# Patient Record
Sex: FEMALE | Race: WHITE | NOT HISPANIC OR LATINO | Employment: OTHER | URBAN - METROPOLITAN AREA
[De-identification: names, ages, dates, MRNs, and addresses within clinical notes are randomized per-mention and may not be internally consistent; named-entity substitution may affect disease eponyms.]

---

## 2017-01-12 ENCOUNTER — OFFICE VISIT (OUTPATIENT)
Dept: MEDICAL GROUP | Facility: CLINIC | Age: 62
End: 2017-01-12
Payer: COMMERCIAL

## 2017-01-12 VITALS
TEMPERATURE: 97.5 F | BODY MASS INDEX: 26.46 KG/M2 | SYSTOLIC BLOOD PRESSURE: 110 MMHG | RESPIRATION RATE: 18 BRPM | DIASTOLIC BLOOD PRESSURE: 70 MMHG | HEART RATE: 70 BPM | WEIGHT: 155 LBS | HEIGHT: 64 IN | OXYGEN SATURATION: 94 %

## 2017-01-12 DIAGNOSIS — I10 ESSENTIAL HYPERTENSION: ICD-10-CM

## 2017-01-12 DIAGNOSIS — E78.2 MIXED HYPERLIPIDEMIA: ICD-10-CM

## 2017-01-12 DIAGNOSIS — R12 HEARTBURN: ICD-10-CM

## 2017-01-12 PROCEDURE — 99214 OFFICE O/P EST MOD 30 MIN: CPT | Performed by: PHYSICIAN ASSISTANT

## 2017-01-12 RX ORDER — ATORVASTATIN CALCIUM 40 MG/1
40 TABLET, FILM COATED ORAL DAILY
Qty: 90 TAB | Refills: 1 | Status: SHIPPED | OUTPATIENT
Start: 2017-01-12 | End: 2017-08-16 | Stop reason: SDUPTHER

## 2017-01-12 RX ORDER — LISINOPRIL 40 MG/1
40 TABLET ORAL DAILY
Qty: 90 TAB | Refills: 1 | Status: SHIPPED | OUTPATIENT
Start: 2017-01-12 | End: 2017-08-16 | Stop reason: SDUPTHER

## 2017-01-12 RX ORDER — HYDROCHLOROTHIAZIDE 25 MG/1
25 TABLET ORAL
Qty: 90 TAB | Refills: 1 | Status: SHIPPED | OUTPATIENT
Start: 2017-01-12 | End: 2017-08-16 | Stop reason: SDUPTHER

## 2017-01-12 ASSESSMENT — PATIENT HEALTH QUESTIONNAIRE - PHQ9: CLINICAL INTERPRETATION OF PHQ2 SCORE: 0

## 2017-01-12 NOTE — PROGRESS NOTES
"Subjective:   CC: Amina Wallace is a 61 y.o. female here today for establishing care pain. Patient was under care of Dr. Luevano.           HTN (hypertension)  Pt has known HTN, controlled with current medicines, lisinopril 40 mg, HCTZ 25mg She denies HA, blurred vision, dizziness, shortness of breath, palpitations, or chest pain, lower extremity edema. Checks BP at home once a wk, with good number.       Hyperlipidemia  Currently on Lipitor 40 mg, no myalgia, tolerates well. Also takes fish oil and krill oil.        Heartburn:  Patient has recently been having heartburn which she notes is due to dietary changes. She recently started to eat a lot of more spicy food with jalapeno which gives her heartburn. No nausea or vomiting, no constipation diarrhea, no blood in the stool or hematemesis.     Current medicines (including changes today)  Current Outpatient Prescriptions   Medication Sig Dispense Refill   • lisinopril (PRINIVIL, ZESTRIL) 40 MG tablet Take 1 Tab by mouth every day. 90 Tab 1   • hydrochlorothiazide (HYDRODIURIL) 25 MG Tab Take 1 Tab by mouth every day. 90 Tab 1   • atorvastatin (LIPITOR) 40 MG Tab Take 1 Tab by mouth every day. 90 Tab 1   • meloxicam (MOBIC) 7.5 MG Tab Take 2 Tabs by mouth every day. 60 Tab 1     No current facility-administered medications for this visit.         Past medical, surgical, family, and social history are reviewed in Epic chart by me today.   Medications and allergies reviewed in Epic chart by me today.         ROS     No chest pain, no shortness of breath,   As documented in history of present illness above     Objective:     Blood pressure 110/70, pulse 70, temperature 36.4 °C (97.5 °F), resp. rate 18, height 1.626 m (5' 4\"), weight 70.308 kg (155 lb), SpO2 94 %. Body mass index is 26.59 kg/(m^2).     Physical Exam:  Constitutional: Alert, oriented in no acute distress.  Psych: Eye contact is good, speech goal directed, affect calm  Lungs: Unlabored respiratory " effort, clear to auscultation bilaterally with good excursion, no wheez or rhonci  CV: regular rate and rhythm. No lower extremity edema      Assessment and Plan:   The following treatment plan was discussed    1. Mixed hyperlipidemia  Continue Lipitor, Omega 3, cranial or  - LIPID PROFILE; Future  - TSH WITH REFLEX TO FT4; Future  - atorvastatin (LIPITOR) 40 MG Tab; Take 1 Tab by mouth every day.  Dispense: 90 Tab; Refill: 1    2. Essential hypertension  Controlled, continue current meds  - CBC WITH DIFFERENTIAL; Future  - COMP METABOLIC PANEL; Future  - lisinopril (PRINIVIL, ZESTRIL) 40 MG tablet; Take 1 Tab by mouth every day.  Dispense: 90 Tab; Refill: 1  - hydrochlorothiazide (HYDRODIURIL) 25 MG Tab; Take 1 Tab by mouth every day.  Dispense: 90 Tab; Refill: 1    3. Heartburn  Discussed decreasing spicy food. No treatment required at this point      Followup: Return in about 6 months (around 7/12/2017), or hypertension, LDL.         Please note that this dictation was created using voice recognition software. I have made every reasonable attempt to correct obvious errors, but I expect that there are errors of grammar and possibly content that I did not discover before finalizing the note.

## 2017-01-12 NOTE — MR AVS SNAPSHOT
"        Amina Wallace   2017 1:25 PM   Office Visit   MRN: [id number]    Department:  [facility]   Dept Phone:  [phone]    Description:  Female : 1955   Provider:  Felicitas Stephens PA-C           Reason for Visit     Establish Care patient is here to establish ca re    Results patient would like lab results    Medication Refill patient states she dis labs to get refills      Allergies as of 2017     Allergen Noted Reactions    Penicillins 2012         You were diagnosed with     Mixed hyperlipidemia   [272.2.ICD-9-CM]       Essential hypertension   [4987252]       Heartburn   [787.1.ICD-9-CM]         Vital Signs     Blood Pressure Pulse Temperature Respirations Height Weight    110/70 mmHg 70 36.4 °C (97.5 °F) 18 1.626 m (5' 4\") 70.308 kg (155 lb)    Body Mass Index Oxygen Saturation Smoking Status             26.59 kg/m2 94% Never Smoker          Basic Information     Date Of Birth Sex Race Ethnicity Preferred Language    1955 Female White Non- English      Problem List              ICD-10-CM Priority Class Noted - Resolved    HTN (hypertension) I10   3/19/2012 - Present    Hyperlipidemia E78.5   3/19/2012 - Present    Groin strain S76.219A   2016 - Present      Health Maintenance        Date Due Completion Dates    MAMMOGRAM 2017, 2014, 2013, 2012, 2009, 2009, 2008, 2008, 2007, 2007    PAP SMEAR 2017    COLONOSCOPY 2020 (N/S)    Override on 2010: (N/S)    IMM DTaP/Tdap/Td Vaccine (2 - Td) 3/10/2025 3/10/2015            Current Immunizations     Influenza Vaccine Quad Inj (Pf) 2015  3:15 PM, 10/2/2014    Influenza Vaccine Quad Inj (Preserved) 10/15/2016    SHINGLES VACCINE 2015  3:15 PM    Tdap Vaccine 3/10/2015  3:00 PM      Below and/or attached are the medications your provider expects you to take. Review all of your home medications and newly ordered " medications with your provider and/or pharmacist. Follow medication instructions as directed by your provider and/or pharmacist. Please keep your medication list with you and share with your provider. Update the information when medications are discontinued, doses are changed, or new medications (including over-the-counter products) are added; and carry medication information at all times in the event of emergency situations     Allergies:  PENICILLINS - (reactions not documented)               Medications  Valid as of: January 12, 2017 -  2:31 PM    Generic Name Brand Name Tablet Size Instructions for use    Atorvastatin Calcium (Tab) LIPITOR 40 MG Take 1 Tab by mouth every day.        HydroCHLOROthiazide (Tab) HYDRODIURIL 25 MG Take 1 Tab by mouth every day.        Lisinopril (Tab) PRINIVIL, ZESTRIL 40 MG Take 1 Tab by mouth every day.        Meloxicam (Tab) MOBIC 7.5 MG Take 2 Tabs by mouth every day.        .                 Medicines prescribed today were sent to:     [facility] [address] [place] [address] (S), [place] - [address]    [facility] [place] [address]    Phone: [phone] Fax: [phone]    Open 24 Hours?: No      Medication refill instructions:       If your prescription bottle indicates you have medication refills left, it is not necessary to call your provider’s office. Please contact your pharmacy and they will refill your medication.    If your prescription bottle indicates you do not have any refills left, you may request refills at any time through one of the following ways: The online [employer] system (except Urgent Care), by calling your provider’s office, or by asking your pharmacy to contact your provider’s office with a refill request. Medication refills are processed only during regular business hours and may not be available until the next business day. Your provider may request additional information or to have a follow-up visit with you prior to refilling your medication.   *Please  Note: Medication refills are assigned a new Rx number when refilled electronically. Your pharmacy may indicate that no refills were authorized even though a new prescription for the same medication is available at the pharmacy. Please request the medicine by name with the pharmacy before contacting your provider for a refill.        Your To Do List     Future Labs/Procedures Complete By Expires    CBC WITH DIFFERENTIAL  As directed 1/13/2018    COMP METABOLIC PANEL  As directed 1/13/2018    LIPID PROFILE  As directed 1/13/2018    TSH WITH REFLEX TO FT4  As directed 1/12/2018         MyChart Access Code: Activation code not generated  Current [employer] Status: Active

## 2017-01-12 NOTE — ASSESSMENT & PLAN NOTE
Pt has known HTN, controlled with current medicines, lisinopril 40 mg, HCTZ 25mg She denies HA, blurred vision, dizziness, shortness of breath, palpitations, or chest pain, lower extremity edema. Checks BP at home once a wk, with good number.

## 2017-08-16 ENCOUNTER — OFFICE VISIT (OUTPATIENT)
Dept: MEDICAL GROUP | Facility: MEDICAL CENTER | Age: 62
End: 2017-08-16
Payer: COMMERCIAL

## 2017-08-16 VITALS
DIASTOLIC BLOOD PRESSURE: 74 MMHG | SYSTOLIC BLOOD PRESSURE: 104 MMHG | HEIGHT: 64 IN | BODY MASS INDEX: 25.37 KG/M2 | WEIGHT: 148.6 LBS | HEART RATE: 87 BPM | OXYGEN SATURATION: 95 % | TEMPERATURE: 98 F | RESPIRATION RATE: 16 BRPM

## 2017-08-16 DIAGNOSIS — Z12.83 SKIN CANCER SCREENING: ICD-10-CM

## 2017-08-16 DIAGNOSIS — Z12.31 ENCOUNTER FOR SCREENING MAMMOGRAM FOR BREAST CANCER: ICD-10-CM

## 2017-08-16 DIAGNOSIS — I10 ESSENTIAL HYPERTENSION: ICD-10-CM

## 2017-08-16 DIAGNOSIS — E78.2 MIXED HYPERLIPIDEMIA: ICD-10-CM

## 2017-08-16 PROCEDURE — 99214 OFFICE O/P EST MOD 30 MIN: CPT | Performed by: PHYSICIAN ASSISTANT

## 2017-08-16 RX ORDER — HYDROCHLOROTHIAZIDE 25 MG/1
25 TABLET ORAL
Qty: 30 TAB | Refills: 11 | Status: SHIPPED | OUTPATIENT
Start: 2017-08-16 | End: 2018-09-07 | Stop reason: SDUPTHER

## 2017-08-16 RX ORDER — ATORVASTATIN CALCIUM 40 MG/1
40 TABLET, FILM COATED ORAL DAILY
Qty: 30 TAB | Refills: 11 | Status: SHIPPED | OUTPATIENT
Start: 2017-08-16 | End: 2018-09-07 | Stop reason: SDUPTHER

## 2017-08-16 RX ORDER — LISINOPRIL 40 MG/1
40 TABLET ORAL DAILY
Qty: 30 TAB | Refills: 11 | Status: SHIPPED | OUTPATIENT
Start: 2017-08-16 | End: 2018-09-07 | Stop reason: SDUPTHER

## 2017-08-16 NOTE — ASSESSMENT & PLAN NOTE
Pt has known HTN, controlled with current medicines, lisinopril 40mg, HCTZ 25 mg. She denies HA, blurred vision, dizziness, shortness of breath, palpitations, or chest pain, lower extremity edema.

## 2017-08-16 NOTE — PROGRESS NOTES
"Subjective:   CC: Amina Wallace is a 61 y.o. female here today for follow up:      Hyperlipidemia  Currently on 40 mg atorvastatin qd w/o any SE or mylagia.  Denies SOP, CP. Admits to having a bad diet. 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   TG elevated 229, HDL 68, .       HTN (hypertension)  Pt has known HTN, controlled with current medicines, lisinopril 40mg, HCTZ 25 mg. She denies HA, blurred vision, dizziness, shortness of breath, palpitations, or chest pain, lower extremity edema.           Current medicines (including changes today)  Current Outpatient Prescriptions   Medication Sig Dispense Refill   • lisinopril (PRINIVIL, ZESTRIL) 40 MG tablet Take 1 Tab by mouth every day. 30 Tab 11   • hydrochlorothiazide (HYDRODIURIL) 25 MG Tab Take 1 Tab by mouth every day. 30 Tab 11   • atorvastatin (LIPITOR) 40 MG Tab Take 1 Tab by mouth every day. 30 Tab 11   • meloxicam (MOBIC) 7.5 MG Tab Take 2 Tabs by mouth every day. 60 Tab 1     No current facility-administered medications for this visit.         Past medical, surgical, family, and social history are reviewed in Epic chart by me today.   Medications and allergies reviewed in Epic chart by me today.         ROS   No chest pain, no shortness of breath, no abdominal pain  As documented in history of present illness above     Objective:     Blood pressure 104/74, pulse 87, temperature 36.7 °C (98 °F), resp. rate 16, height 1.626 m (5' 4.02\"), weight 67.405 kg (148 lb 9.6 oz), SpO2 95 %. Body mass index is 25.49 kg/(m^2).   Physical Exam:  Constitutional: Alert, oriented in no acute distress.  Psych: Eye contact is good, speech goal directed, affect calm  Lungs: Unlabored respiratory effort, clear to auscultation bilaterally with good excursion, no wheez or rhonci  CV: regular rate and rhythm. No lower extremity edema  Ext: no edema, color normal, vascularity normal, temperature normal    Last lab results were " reviewed by me today and discussed w patient.      Assessment and Plan:   The following treatment plan was discussed    1. Mixed hyperlipidemia  Discussed TLC (therapeutic lifestyle change) w patient, supplement such as fish oil  Repeat labs to reevaluate lipid levels in 6 months  - atorvastatin (LIPITOR) 40 MG Tab; Take 1 Tab by mouth every day.  Dispense: 30 Tab; Refill: 11  -lipid profile    2. Essential hypertension    - lisinopril (PRINIVIL, ZESTRIL) 40 MG tablet; Take 1 Tab by mouth every day.  Dispense: 30 Tab; Refill: 11  - hydrochlorothiazide (HYDRODIURIL) 25 MG Tab; Take 1 Tab by mouth every day.  Dispense: 30 Tab; Refill: 11    3. Encounter for screening mammogram for breast cancer    - MA-SCREEN MAMMO W/CAD-BILAT      Followup: Return in about 6 months (around 2/16/2018).         Please note that this dictation was created using voice recognition software. I have made every reasonable attempt to correct obvious errors, but I expect that there are errors of grammar and possibly content that I did not discover before finalizing the note.

## 2017-08-16 NOTE — MR AVS SNAPSHOT
"        Amina Wallace   2017 3:00 PM   Office Visit   MRN: [id number]    Department:  [facility]   Dept Phone:  [phone]    Description:  Female : 1955   Provider:  Felicitas Stephens PA-C           Reason for Visit     Follow-Up     Hypertension Review lab results      Allergies as of 2017     Allergen Noted Reactions    Penicillins 2012         You were diagnosed with     Mixed hyperlipidemia   [272.2.ICD-9-CM]       Essential hypertension   [7411381]       Encounter for screening mammogram for breast cancer   [7547482]       Skin cancer screening   [917813]         Vital Signs     Blood Pressure Pulse Temperature Respirations Height Weight    104/74 mmHg 87 36.7 °C (98 °F) 16 1.626 m (5' 4.02\") 67.405 kg (148 lb 9.6 oz)    Body Mass Index Oxygen Saturation Smoking Status             25.49 kg/m2 95% Never Smoker          Basic Information     Date Of Birth Sex Race Ethnicity Preferred Language    1955 Female White Non- English      Problem List              ICD-10-CM Priority Class Noted - Resolved    HTN (hypertension) I10   3/19/2012 - Present    Hyperlipidemia E78.5   3/19/2012 - Present    Groin strain S76.219A   2016 - Present      Health Maintenance        Date Due Completion Dates    MAMMOGRAM 2017, 2014, 2013, 2012, 2009, 2009, 2008, 2008, 2007, 2007    IMM INFLUENZA (1) 2017 10/15/2016, 2015, 10/2/2014    PAP SMEAR 2017    COLONOSCOPY 2020 (N/S)    Override on 2010: (N/S)    IMM DTaP/Tdap/Td Vaccine (2 - Td) 3/10/2025 3/10/2015            Current Immunizations     Influenza Vaccine Quad Inj (Pf) 2015  3:15 PM, 10/2/2014    Influenza Vaccine Quad Inj (Preserved) 10/15/2016    SHINGLES VACCINE 2015  3:15 PM    Tdap Vaccine 3/10/2015  3:00 PM      Below and/or attached are the medications your provider expects you to take. Review all of your home " medications and newly ordered medications with your provider and/or pharmacist. Follow medication instructions as directed by your provider and/or pharmacist. Please keep your medication list with you and share with your provider. Update the information when medications are discontinued, doses are changed, or new medications (including over-the-counter products) are added; and carry medication information at all times in the event of emergency situations     Allergies:  PENICILLINS - (reactions not documented)               Medications  Valid as of: August 16, 2017 -  3:07 PM    Generic Name Brand Name Tablet Size Instructions for use    Atorvastatin Calcium (Tab) LIPITOR 40 MG Take 1 Tab by mouth every day.        HydroCHLOROthiazide (Tab) HYDRODIURIL 25 MG Take 1 Tab by mouth every day.        Lisinopril (Tab) PRINIVIL, ZESTRIL 40 MG Take 1 Tab by mouth every day.        Meloxicam (Tab) MOBIC 7.5 MG Take 2 Tabs by mouth every day.        .                 Medicines prescribed today were sent to:     [facility] [address] [place] [address] (S), [place] - [address] [place] [id number] [facility] ([facility] [place] [address]    Phone: [phone] Fax: [phone]    Open 24 Hours?: No      Medication refill instructions:       If your prescription bottle indicates you have medication refills left, it is not necessary to call your provider’s office. Please contact your pharmacy and they will refill your medication.    If your prescription bottle indicates you do not have any refills left, you may request refills at any time through one of the following ways: The online [employer] system (except Urgent Care), by calling your provider’s office, or by asking your pharmacy to contact your provider’s office with a refill request. Medication refills are processed only during regular business hours and may not be available until the next business day. Your provider may request additional information or to have a follow-up visit with you prior to  refilling your medication.   *Please Note: Medication refills are assigned a new Rx number when refilled electronically. Your pharmacy may indicate that no refills were authorized even though a new prescription for the same medication is available at the pharmacy. Please request the medicine by name with the pharmacy before contacting your provider for a refill.        Your To Do List     Future Labs/Procedures Complete By Expires    LIPID PROFILE  As directed 8/17/2018      Referral     A referral request has been sent to our patient care coordination department. Please allow 3-5 business days for us to process this request and contact you either by phone or mail. If you do not hear from us by the 5th business day, please call us at [phone].           [employer] Access Code: Activation code not generated  Current [employer] Status: Active

## 2017-10-30 ENCOUNTER — APPOINTMENT (OUTPATIENT)
Dept: RADIOLOGY | Facility: MEDICAL CENTER | Age: 62
End: 2017-10-30
Attending: PHYSICIAN ASSISTANT
Payer: COMMERCIAL

## 2017-11-27 ENCOUNTER — HOSPITAL ENCOUNTER (OUTPATIENT)
Dept: RADIOLOGY | Facility: MEDICAL CENTER | Age: 62
End: 2017-11-27
Attending: PHYSICIAN ASSISTANT
Payer: COMMERCIAL

## 2017-11-27 PROCEDURE — G0202 SCR MAMMO BI INCL CAD: HCPCS

## 2018-08-22 ENCOUNTER — TELEPHONE (OUTPATIENT)
Dept: MEDICAL GROUP | Facility: MEDICAL CENTER | Age: 63
End: 2018-08-22

## 2018-08-22 NOTE — TELEPHONE ENCOUNTER
Please inform patient that her triglyceride has increased from 217 --> 307 also her LDL cholesterol has increased from 133-->161.  As she is still taking atorvastatin 40 mg daily?  We need to discuss treatment and lifestyle changes in her next appointment.    Felicitas Stephens P.A.-C.

## 2018-08-22 NOTE — TELEPHONE ENCOUNTER
"· [employer] paperwork received from fax requiring.     · All appropriate fields completed by Medical Assistant: Yes    · Paperwork placed in \"MA to Provider\" folder/basket. Awaiting provider completion/signature.    "

## 2018-08-28 NOTE — TELEPHONE ENCOUNTER
Patient states she has an appointment on 9/7/2018 and she will discuss all labs and medication at that time. She will also get the Flu vaccine at that time.

## 2018-09-07 ENCOUNTER — OFFICE VISIT (OUTPATIENT)
Dept: MEDICAL GROUP | Facility: MEDICAL CENTER | Age: 63
End: 2018-09-07
Payer: COMMERCIAL

## 2018-09-07 VITALS
HEIGHT: 64 IN | DIASTOLIC BLOOD PRESSURE: 60 MMHG | SYSTOLIC BLOOD PRESSURE: 120 MMHG | WEIGHT: 143.3 LBS | TEMPERATURE: 97.4 F | BODY MASS INDEX: 24.46 KG/M2 | HEART RATE: 87 BPM | OXYGEN SATURATION: 96 %

## 2018-09-07 DIAGNOSIS — M25.511 CHRONIC RIGHT SHOULDER PAIN: ICD-10-CM

## 2018-09-07 DIAGNOSIS — G89.29 OTHER CHRONIC PAIN: ICD-10-CM

## 2018-09-07 DIAGNOSIS — Z23 NEED FOR VACCINATION: ICD-10-CM

## 2018-09-07 DIAGNOSIS — G89.29 CHRONIC RIGHT SHOULDER PAIN: ICD-10-CM

## 2018-09-07 DIAGNOSIS — E78.2 MIXED HYPERLIPIDEMIA: ICD-10-CM

## 2018-09-07 DIAGNOSIS — I10 ESSENTIAL HYPERTENSION: ICD-10-CM

## 2018-09-07 PROCEDURE — 90686 IIV4 VACC NO PRSV 0.5 ML IM: CPT | Performed by: PHYSICIAN ASSISTANT

## 2018-09-07 PROCEDURE — 99214 OFFICE O/P EST MOD 30 MIN: CPT | Mod: 25 | Performed by: PHYSICIAN ASSISTANT

## 2018-09-07 PROCEDURE — 90471 IMMUNIZATION ADMIN: CPT | Performed by: PHYSICIAN ASSISTANT

## 2018-09-07 RX ORDER — LISINOPRIL 40 MG/1
40 TABLET ORAL DAILY
Qty: 30 TAB | Refills: 11 | Status: SHIPPED | OUTPATIENT
Start: 2018-09-07 | End: 2019-09-24 | Stop reason: SDUPTHER

## 2018-09-07 RX ORDER — HYDROCHLOROTHIAZIDE 25 MG/1
25 TABLET ORAL
Qty: 30 TAB | Refills: 11 | Status: SHIPPED | OUTPATIENT
Start: 2018-09-07 | End: 2019-09-24 | Stop reason: SDUPTHER

## 2018-09-07 RX ORDER — HYDROCODONE BITARTRATE AND ACETAMINOPHEN 5; 325 MG/1; MG/1
1 TABLET ORAL EVERY 4 HOURS PRN
Qty: 30 TAB | Refills: 0 | Status: SHIPPED | OUTPATIENT
Start: 2018-09-07 | End: 2018-09-07

## 2018-09-07 RX ORDER — ATORVASTATIN CALCIUM 40 MG/1
40 TABLET, FILM COATED ORAL DAILY
Qty: 30 TAB | Refills: 11 | Status: SHIPPED | OUTPATIENT
Start: 2018-09-07 | End: 2020-11-30

## 2018-09-07 RX ORDER — HYDROCODONE BITARTRATE AND ACETAMINOPHEN 5; 325 MG/1; MG/1
1 TABLET ORAL
Qty: 30 TAB | Refills: 0 | Status: SHIPPED | OUTPATIENT
Start: 2018-09-07 | End: 2018-10-10 | Stop reason: SDUPTHER

## 2018-09-07 ASSESSMENT — PATIENT HEALTH QUESTIONNAIRE - PHQ9: CLINICAL INTERPRETATION OF PHQ2 SCORE: 0

## 2018-09-07 NOTE — PROGRESS NOTES
Subjective:   CC:   Chief Complaint   Patient presents with   • Results     Lab review    • Shoulder Pain     (R)       Amina Wallace is a 62 y.o. female here today for right shoulder pain and follow-up on blood work, hyperlipidemia, hypertension    Patient complains of right shoulder pain that has been going on for a couple years and gradually been getting worse.  She has tried physical therapy without much help and she had injections which did not help and was also very painful for her.  She has tried tramadol however tramadol makes her through up.  She takes a small amount of Norco as needed which take the edge off and makes pain tolerable.  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  She has had her right shoulder checked and has had arthroscopy examination, was told that it is arthritis and she is going to need shoulder replacement however that is going to have a 6 months recovery and patient cannot afford to be off work at this point.  She has 5 more years to work until she is retired and that she can get the surgery.    Pt has known HTN, controlled with current medicines, lisinopril 40 mg,  She denies HA, blurred vision, dizziness, shortness of breath, palpitations, or chest pain, lower extremity edema.      Patient has elevated cholesterol and elevated triglycerides which unfortunately has been getting worse despite of her getting atorvastatin 40 mg daily,  --> 161,  --> 307.  Patient admits to not being compliant with her diet.  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  She has eggs with a yolk in the morning and she drinks 2% fat milk.                     Current medicines (including changes today)  Current Outpatient Prescriptions   Medication Sig Dispense Refill   • lisinopril (PRINIVIL,  "ZESTRIL) 40 MG tablet Take 1 Tab by mouth every day. 30 Tab 11   • hydroCHLOROthiazide (HYDRODIURIL) 25 MG Tab Take 1 Tab by mouth every day. 30 Tab 11   • atorvastatin (LIPITOR) 40 MG Tab Take 1 Tab by mouth every day. 30 Tab 11   • HYDROcodone-acetaminophen (NORCO) 5-325 MG Tab per tablet Take 1 Tab by mouth 1 time daily as needed for up to 30 days. 30 Tab 0   • meloxicam (MOBIC) 7.5 MG Tab Take 2 Tabs by mouth every day. 60 Tab 1     No current facility-administered medications for this visit.          Past medical, surgical, family, and social history are reviewed in Epic chart by me today.   Medications and allergies reviewed in Epic chart by me today.         ROS   No chest pain, no shortness of breath, no abdominal pain  As documented in history of present illness above     Objective:     Blood pressure 120/60, pulse 87, temperature 36.3 °C (97.4 °F), height 1.626 m (5' 4.02\"), weight 65 kg (143 lb 4.8 oz), SpO2 96 %, not currently breastfeeding. Body mass index is 24.58 kg/m².   Physical Exam:  Constitutional: Alert, oriented in no acute distress.  Psych: Eye contact is good, speech goal directed, affect calm  Eyes: Conjunctiva non-injected, sclera non-icteric.  Lungs: Unlabored respiratory effort, clear to auscultation bilaterally with good excursion, no wheez or rhonci  CV: regular rate and rhythm. No lower extremity edema  Ms: Unable to lift the right arm completely above head.    Assessment and Plan:   The following treatment plan was discussed    1. Need for vaccination  - INFLUENZA VACCINE QUAD INJ >3Y(PF)    2. Mixed hyperlipidemia  Discussed different options with patient, increasing atorvastatin dose to 80, switching to Crestor which is a stronger statin, dietary and lifestyle changes.  Patient decided to do TLC and recheck cholesterol in about 3 months.  We are going to try increase fiber and vegetable intake, cut back on fatty foods rich foods and red meat, cut back on a Lien and she wished to " nonfat dairy products.  - atorvastatin (LIPITOR) 40 MG Tab; Take 1 Tab by mouth every day.  Dispense: 30 Tab; Refill: 11    3. Essential hypertension    - lisinopril (PRINIVIL, ZESTRIL) 40 MG tablet; Take 1 Tab by mouth every day.  Dispense: 30 Tab; Refill: 11  - hydroCHLOROthiazide (HYDRODIURIL) 25 MG Tab; Take 1 Tab by mouth every day.  Dispense: 30 Tab; Refill: 11    4. Chronic right shoulder pain  For now we are going to treat patient with Norco since surgery is not an option and physical therapy and injections have been inadequate in the past.  She also takes ibuprofen as needed.  - CONSENT FOR OPIATE PRESCRIPTION  - Consent for Opiate Prescription  - HYDROcodone-acetaminophen (NORCO) 5-325 MG Tab per tablet; Take 1 Tab by mouth 1 time daily as needed for up to 30 days.  Dispense: 30 Tab; Refill: 0    5. Other chronic pain  Discussed benefits and risks of hydrocodone.  Knows not to mix with alcohol.  - HYDROcodone-acetaminophen (NORCO) 5-325 MG Tab per tablet; Take 1 Tab by mouth 1 time daily as needed for up to 30 days.  Dispense: 30 Tab; Refill: 0      Followup: Return in about 5 weeks (around 10/12/2018) for pap,  [place] refill.         Please note that this dictation was created using voice recognition software. I have made every reasonable attempt to correct obvious errors, but I expect that there are errors of grammar and possibly content that I did not discover before finalizing the note.

## 2018-10-10 ENCOUNTER — HOSPITAL ENCOUNTER (OUTPATIENT)
Facility: MEDICAL CENTER | Age: 63
End: 2018-10-10
Attending: PHYSICIAN ASSISTANT
Payer: COMMERCIAL

## 2018-10-10 ENCOUNTER — OFFICE VISIT (OUTPATIENT)
Dept: MEDICAL GROUP | Facility: MEDICAL CENTER | Age: 63
End: 2018-10-10
Payer: COMMERCIAL

## 2018-10-10 VITALS
OXYGEN SATURATION: 100 % | BODY MASS INDEX: 24.58 KG/M2 | TEMPERATURE: 98.2 F | WEIGHT: 143.96 LBS | HEIGHT: 64 IN | HEART RATE: 76 BPM | DIASTOLIC BLOOD PRESSURE: 70 MMHG | SYSTOLIC BLOOD PRESSURE: 116 MMHG

## 2018-10-10 DIAGNOSIS — M25.511 CHRONIC RIGHT SHOULDER PAIN: ICD-10-CM

## 2018-10-10 DIAGNOSIS — N95.2 VAGINAL ATROPHY: ICD-10-CM

## 2018-10-10 DIAGNOSIS — Z01.419 ENCOUNTER FOR ANNUAL ROUTINE GYNECOLOGICAL EXAMINATION: ICD-10-CM

## 2018-10-10 DIAGNOSIS — Z12.4 SCREENING FOR MALIGNANT NEOPLASM OF CERVIX: ICD-10-CM

## 2018-10-10 DIAGNOSIS — H26.9 CATARACT OF LEFT EYE, UNSPECIFIED CATARACT TYPE: ICD-10-CM

## 2018-10-10 DIAGNOSIS — G89.29 CHRONIC RIGHT SHOULDER PAIN: ICD-10-CM

## 2018-10-10 DIAGNOSIS — G89.29 OTHER CHRONIC PAIN: ICD-10-CM

## 2018-10-10 PROCEDURE — 99396 PREV VISIT EST AGE 40-64: CPT | Performed by: PHYSICIAN ASSISTANT

## 2018-10-10 RX ORDER — ESTRADIOL 0.1 MG/G
0.5 CREAM VAGINAL DAILY
Qty: 1 TUBE | Refills: 1 | Status: SHIPPED | OUTPATIENT
Start: 2018-10-10 | End: 2020-11-30

## 2018-10-10 RX ORDER — HYDROCODONE BITARTRATE AND ACETAMINOPHEN 5; 325 MG/1; MG/1
1 TABLET ORAL EVERY 4 HOURS PRN
Qty: 30 TAB | Refills: 0 | Status: SHIPPED | OUTPATIENT
Start: 2018-10-10 | End: 2019-06-04 | Stop reason: SDUPTHER

## 2018-10-10 NOTE — PROGRESS NOTES
SUBJECTIVE:   Chief Complaint   Patient presents with   • Gynecologic Exam       63 y.o. female for annual routine gynecologic exam. Generally the patient is feeling good. She has no complaints or concerns.   Patient continues to have right shoulder pain with limited ROM.  She continues to take Norco as needed which helps with the pain    Regarding her health maintenance:       Obstetric History       T0      L0     SAB0   TAB0   Ectopic0   Molar0   Multiple0   Live Births0       History   Sexual Activity   • Sexual activity: No       Last Pap:   H/O Abnormal Pap no  Patient is menopausal and very infrequently sexually active.  No significant bloating/fluid retention, pelvic pain,  No vaginal discharge  No breast tenderness, mass, nipple discharge, changes in size or contour, or abnormal cyclic discomfort.        ROS:    NO SOB, CP, Palpitations, hest pain on exertion.  No urinary tract symptoms, no incontinence, hematuria.   No vaginal discharge   No abdominal pain, change in bowel habits, black or bloody stools.    No rash,  pigment changes.       Social History   Substance Use Topics   • Smoking status: Never Smoker   • Smokeless tobacco: Never Used   • Alcohol use Yes      Comment: 2 glasses wine/day       Patient Active Problem List    Diagnosis Date Noted   • Chronic right shoulder pain 2018   • HTN (hypertension) 2012   • Hyperlipidemia 2012       Past Medical History:   Diagnosis Date   • Hyperlipidemia 3/19/2012     Past Surgical History:   Procedure Laterality Date   • KNEE ARTHROPLASTY TOTAL           Family History   Problem Relation Age of Onset   • Cancer Mother         ovarian     Family Status   Relation Status   • Mo Alive   • Fa Alive         Current medicines (including changes today)  Current Outpatient Prescriptions   Medication Sig Dispense Refill   • estradiol (ESTRACE) 0.1 MG/GM vaginal cream Insert 0.5 g in vagina every day. 1 Tube 1   •  "HYDROcodone-acetaminophen (NORCO) 5-325 MG Tab per tablet Take 1 Tab by mouth every four hours as needed for up to 7 days. 30 Tab 0   • lisinopril (PRINIVIL, ZESTRIL) 40 MG tablet Take 1 Tab by mouth every day. 30 Tab 11   • hydroCHLOROthiazide (HYDRODIURIL) 25 MG Tab Take 1 Tab by mouth every day. 30 Tab 11   • atorvastatin (LIPITOR) 40 MG Tab Take 1 Tab by mouth every day. 30 Tab 11   • meloxicam (MOBIC) 7.5 MG Tab Take 2 Tabs by mouth every day. 60 Tab 1     No current facility-administered medications for this visit.            Allergies: Penicillins     Preventive Care:  Health Maintenance Topics with due status: Overdue       Topic Date Due    IMM ZOSTER VACCINES 01/11/2016    PAP SMEAR 09/09/2017       OBJECTIVE:   /70 (BP Location: Right arm, Patient Position: Sitting, BP Cuff Size: Adult)   Pulse 76   Temp 36.8 °C (98.2 °F) (Temporal)   Ht 1.626 m (5' 4\")   Wt 65.3 kg (143 lb 15.4 oz)   SpO2 100%   BMI 24.71 kg/m²   Body mass index is 24.71 kg/m².      Gen: Well developed, well nourished in no acute distress.   Skin: Pink, warm, and dry  HEENT: conjunctiva non-injected, sclera non-icteric. EOMs intact.   Nasal mucosa without edema nor erythema.   Lungs: Clear to auscultation bilaterally with good excursion, no wheezes or rhonchi  CV: regular rate and rhythm.  Abdomen: soft, nontender, there is a healing bruise w yellow and green discoloration over R side and palpable lump  Ext: no edema, color normal, vascularity normal, temperature normal  Alert and oriented Eye contact is good, speech goal directed, affect calm     Breast Exam: Performed with instruction during examination. No axillary lymphadenopathy, no skin changes, no dominant masses. No nipple retraction, no nipple discharge    Pelvic Exam:  Normal external genitalia with no lesions.  Vagina looks atrophic with paper thin tissue and decreased rogation, patient had small cuts with bleeding during speculum insertion and pain.  Could not " inform Brandt visualize cervix due to pain had to stop.  Pain upon bimanual exam as well    <ASSESSMENT and PLAN>  1. Cataract of left eye, unspecified cataract type  REFERRAL TO OPHTHALMOLOGY   2. Screening for malignant neoplasm of cervix  REFERRAL TO OB/GYN    CANCELED: THINPREP PAP WITH HPV   3. Vaginal atrophy  estradiol (ESTRACE) 0.1 MG/GM vaginal cream   4. Chronic right shoulder pain  HYDROcodone-acetaminophen (NORCO) 5-325 MG Tab per tablet   5. Other chronic pain  HYDROcodone-acetaminophen (NORCO) 5-325 MG Tab per tablet   6. Encounter for annual routine gynecological examination       Patient states she did not have this much pain when she was given Pap smear 4 years ago.  Discussed with patient that she can go to Dr. [facility],   Or she can try Estrace topical vaginal cream to make tissue little bit healthier and she can return for Pap smear    She continues to have right shoulder pain which we decided to treat with Norco at this point until she is retired and can get surgery done.  I have reviewed the Prescription Monitoring Program () today, last refill was in 9/11/2018, patient knows not to mix with alcohol        Follow-up prn

## 2018-10-12 LAB
FORWARD REASON: SPWHY: NORMAL
FORWARDED TO LAB: SPWHR: NORMAL
SPECIMEN SENT: SPWT1: NORMAL

## 2018-12-26 ENCOUNTER — TELEPHONE (OUTPATIENT)
Dept: MEDICAL GROUP | Facility: MEDICAL CENTER | Age: 63
End: 2018-12-26

## 2018-12-26 ENCOUNTER — HOSPITAL ENCOUNTER (OUTPATIENT)
Dept: RADIOLOGY | Facility: MEDICAL CENTER | Age: 63
End: 2018-12-26
Attending: PHYSICIAN ASSISTANT
Payer: COMMERCIAL

## 2018-12-26 DIAGNOSIS — Z12.39 SCREENING BREAST EXAMINATION: ICD-10-CM

## 2018-12-27 ENCOUNTER — TELEPHONE (OUTPATIENT)
Dept: RADIOLOGY | Facility: MEDICAL CENTER | Age: 63
End: 2018-12-27

## 2018-12-27 NOTE — TELEPHONE ENCOUNTER
Phone Number Called: [phone] (home)       Message: LVM for pt. To call back to ask why she needs a diagnostic mammogram vs a normal mammogram.     Left Message for patient to call back: yes

## 2018-12-27 NOTE — TELEPHONE ENCOUNTER
Left pt a mess to schedule an appointment with a provider in order to be seen and re evaluated for breast pain because this could be a sign of anything and Felicitas is out on maternity leave.

## 2018-12-27 NOTE — TELEPHONE ENCOUNTER
1. Caller Name: Amina Wallace                                         Call Back Number: [phone] (home)       Patient approves a detailed voicemail message: N\A    2. SPECIFIC Action To Be Taken: Orders pending, please sign.    3. Diagnosis/Clinical Reason for Request: Annual Mamogram    4. Specialty & Provider Name/Lab/Imaging Location: [facility]    5. Is appointment scheduled for requested order/referral: no    Patient was not informed they will receive a return phone call from the office ONLY if there are any questions before processing their request. Advised to call back if they haven't received a call from the referral department in 5 days.      Felicitas Pt./ needs mammogram ordered.

## 2018-12-27 NOTE — TELEPHONE ENCOUNTER
----- Message from Joyce Smith sent at 12/26/2018  4:07 PM PST -----  Regarding: REFERRAL CHANGE   Patient went to get david done today and was told there needs to be a new referral and changed to a diagnostic mammogram

## 2018-12-27 NOTE — TELEPHONE ENCOUNTER
RETURNED CALL TO ADVISE PT'S SCREENING MAMMOGRAM APPT WAS CX'D DUE TO BREAST PAIN; PT SHOULD BE R/S TO [facility]

## 2018-12-27 NOTE — TELEPHONE ENCOUNTER
Spoke with Breast center. They stated she was having left breast pain and that is why they would like to do a diagnostic mammogram.

## 2019-01-08 ENCOUNTER — OFFICE VISIT (OUTPATIENT)
Dept: MEDICAL GROUP | Facility: MEDICAL CENTER | Age: 64
End: 2019-01-08
Payer: COMMERCIAL

## 2019-01-08 VITALS
HEIGHT: 64 IN | OXYGEN SATURATION: 98 % | SYSTOLIC BLOOD PRESSURE: 118 MMHG | TEMPERATURE: 98.1 F | RESPIRATION RATE: 20 BRPM | WEIGHT: 146.83 LBS | HEART RATE: 98 BPM | BODY MASS INDEX: 25.07 KG/M2 | DIASTOLIC BLOOD PRESSURE: 72 MMHG

## 2019-01-08 DIAGNOSIS — N64.4 BREAST PAIN IN FEMALE: ICD-10-CM

## 2019-01-08 PROCEDURE — 99213 OFFICE O/P EST LOW 20 MIN: CPT | Performed by: FAMILY MEDICINE

## 2019-01-08 ASSESSMENT — PATIENT HEALTH QUESTIONNAIRE - PHQ9: CLINICAL INTERPRETATION OF PHQ2 SCORE: 0

## 2019-01-08 NOTE — ASSESSMENT & PLAN NOTE
Patient states that she was at the imaging center about to get her mammogram when she discussed with the tech that she was having breast pain, especially in the left.  The patient states that she has tenderness to palpation on the left for the past couple months.  Also has intermittent right breast tenderness.  She denies any distinct nodules.  The patient was told imaging center at that she should get a diagnostic mammogram rather than the screening mammogram.

## 2019-01-09 NOTE — PROGRESS NOTES
Subjective:     CC: The encounter diagnosis was Breast pain in female.    HPI: Patient is a 63 y.o. female established patient who presents today to discuss breast pain, requesting a diagnostic mammogram.      Breast pain in female  Patient states that she was at the imaging center about to get her mammogram when she discussed with the tech that she was having breast pain, especially in the left.  The patient states that she has tenderness to palpation on the left for the past couple months.  Also has intermittent right breast tenderness.  She denies any distinct nodules.  The patient was told imaging center at that she should get a diagnostic mammogram rather than the screening mammogram.  Patient has no family history of breast cancer, has had normal yearly mammograms for the past 15-20 years.      Past Medical History:   Diagnosis Date   • Hyperlipidemia 3/19/2012       Social History   Substance Use Topics   • Smoking status: Never Smoker   • Smokeless tobacco: Never Used   • Alcohol use Yes      Comment: 2 glasses wine/day       Current Outpatient Prescriptions Ordered in [facility]   Medication Sig Dispense Refill   • lisinopril (PRINIVIL, ZESTRIL) 40 MG tablet Take 1 Tab by mouth every day. 30 Tab 11   • hydroCHLOROthiazide (HYDRODIURIL) 25 MG Tab Take 1 Tab by mouth every day. 30 Tab 11   • atorvastatin (LIPITOR) 40 MG Tab Take 1 Tab by mouth every day. 30 Tab 11   • estradiol (ESTRACE) 0.1 MG/GM vaginal cream Insert 0.5 g in vagina every day. (Patient not taking: Reported on 1/8/2019) 1 Tube 1   • meloxicam (MOBIC) 7.5 MG Tab Take 2 Tabs by mouth every day. 60 Tab 1     No current Epic-ordered facility-administered medications on file.        Allergies:  Penicillins    ROS:  Gen: no fevers/chill, no changes in weight  Pulm: no sob, no cough  CV: no chest pain, no palpitations  GI: no nausea/vomiting, no diarrhea  : no dysuria      Objective:       Exam:  /72 (Patient Position: Sitting)   Pulse 98   Temp  "36.7 °C (98.1 °F) (Temporal)   Resp 20   Ht 1.626 m (5' 4\")   Wt 66.6 kg (146 lb 13.2 oz)   SpO2 98%   BMI 25.20 kg/m²  Body mass index is 25.2 kg/m².    Gen: Alert and oriented, No apparent distress.  Neck: Neck is supple without lymphadenopathy.  Lungs: Normal effort, CTA bilaterally, no wheezes, rhonchi, or rales  CV: Regular rate and rhythm. No murmurs, rubs, or gallops.       Breast:  Bilaterally symmetrical, bilaterally inverted nipples with no nipple discharge, no skin changes or dimpling are  noted.  Both breasts are free of palpable pathology and the axillae are free of lymphadenopathy.          Ext: No clubbing, cyanosis, edema.      Assessment & Plan:     63 y.o. female with the following -     1. Breast pain in female  New problem.  Bilateral breast pain, worst in the left breast.  Ordered bilateral diagnostic mammogram.  - MA-DIAGNOSTIC MAMMO W/CAD-BILAT; Standing    No Follow-up on file.    Please note that this dictation was created using voice recognition software. I have made every reasonable attempt to correct obvious errors, but I expect that there are errors of grammar and possibly content that I did not discover before finalizing the note.      "

## 2019-02-11 ENCOUNTER — HOSPITAL ENCOUNTER (OUTPATIENT)
Dept: RADIOLOGY | Facility: MEDICAL CENTER | Age: 64
End: 2019-02-11
Attending: PHYSICIAN ASSISTANT
Payer: COMMERCIAL

## 2019-02-11 DIAGNOSIS — N64.4 BREAST PAIN: ICD-10-CM

## 2019-02-11 PROCEDURE — G0279 TOMOSYNTHESIS, MAMMO: HCPCS

## 2019-03-28 DIAGNOSIS — Z12.83 SKIN CANCER SCREENING: ICD-10-CM

## 2019-03-28 NOTE — PROGRESS NOTES
1. Caller Name: Amina Wallace                                           Call Back Number: [phone] (home)         Patient approves a detailed voicemail message: N\A    2. SPECIFIC Action To Be Taken: Referral pending, please sign.    3. Diagnosis/Clinical Reason for Request: Skin check    4. Specialty & Provider Name/Lab/Imaging Location: [facility]  [address] [place] [address]  [place]  [address]  Phone: [phone]    5. Is appointment scheduled for requested order/referral: yes - 4/1/19    Patient was informed they will receive a return phone call from the office ONLY if there are any questions before processing their request. Advised to call back if they haven't received a call from the referral department in 5 days.

## 2019-04-01 ENCOUNTER — APPOINTMENT (RX ONLY)
Dept: URBAN - METROPOLITAN AREA CLINIC 20 | Facility: CLINIC | Age: 64
Setting detail: DERMATOLOGY
End: 2019-04-01

## 2019-04-01 DIAGNOSIS — L57.8 OTHER SKIN CHANGES DUE TO CHRONIC EXPOSURE TO NONIONIZING RADIATION: ICD-10-CM

## 2019-04-01 DIAGNOSIS — D22 MELANOCYTIC NEVI: ICD-10-CM

## 2019-04-01 DIAGNOSIS — L81.4 OTHER MELANIN HYPERPIGMENTATION: ICD-10-CM

## 2019-04-01 DIAGNOSIS — D18.0 HEMANGIOMA: ICD-10-CM

## 2019-04-01 DIAGNOSIS — L82.1 OTHER SEBORRHEIC KERATOSIS: ICD-10-CM

## 2019-04-01 PROBLEM — E78.5 HYPERLIPIDEMIA, UNSPECIFIED: Status: ACTIVE | Noted: 2019-04-01

## 2019-04-01 PROBLEM — D18.01 HEMANGIOMA OF SKIN AND SUBCUTANEOUS TISSUE: Status: ACTIVE | Noted: 2019-04-01

## 2019-04-01 PROBLEM — D22.5 MELANOCYTIC NEVI OF TRUNK: Status: ACTIVE | Noted: 2019-04-01

## 2019-04-01 PROBLEM — I10 ESSENTIAL (PRIMARY) HYPERTENSION: Status: ACTIVE | Noted: 2019-04-01

## 2019-04-01 PROCEDURE — ? COUNSELING

## 2019-04-01 PROCEDURE — 99203 OFFICE O/P NEW LOW 30 MIN: CPT

## 2019-04-01 ASSESSMENT — LOCATION SIMPLE DESCRIPTION DERM
LOCATION SIMPLE: CHEST
LOCATION SIMPLE: RIGHT CALF
LOCATION SIMPLE: RIGHT FOREARM
LOCATION SIMPLE: RIGHT THIGH
LOCATION SIMPLE: LEFT CALF
LOCATION SIMPLE: RIGHT CHEEK
LOCATION SIMPLE: RIGHT UPPER ARM
LOCATION SIMPLE: RIGHT UPPER BACK
LOCATION SIMPLE: LEFT UPPER ARM
LOCATION SIMPLE: LEFT THIGH
LOCATION SIMPLE: LEFT FOREARM
LOCATION SIMPLE: LEFT CHEEK

## 2019-04-01 ASSESSMENT — LOCATION DETAILED DESCRIPTION DERM
LOCATION DETAILED: RIGHT ANTERIOR PROXIMAL UPPER ARM
LOCATION DETAILED: RIGHT PROXIMAL DORSAL FOREARM
LOCATION DETAILED: LEFT ANTERIOR DISTAL THIGH
LOCATION DETAILED: RIGHT MID-UPPER BACK
LOCATION DETAILED: RIGHT DISTAL CALF
LOCATION DETAILED: LEFT INFERIOR CENTRAL MALAR CHEEK
LOCATION DETAILED: RIGHT INFERIOR UPPER BACK
LOCATION DETAILED: RIGHT SUPERIOR MEDIAL UPPER BACK
LOCATION DETAILED: RIGHT ANTERIOR DISTAL THIGH
LOCATION DETAILED: LEFT DISTAL CALF
LOCATION DETAILED: LEFT ANTERIOR PROXIMAL UPPER ARM
LOCATION DETAILED: LEFT PROXIMAL DORSAL FOREARM
LOCATION DETAILED: LEFT MEDIAL SUPERIOR CHEST
LOCATION DETAILED: RIGHT CENTRAL MALAR CHEEK

## 2019-04-01 ASSESSMENT — LOCATION ZONE DERM
LOCATION ZONE: ARM
LOCATION ZONE: TRUNK
LOCATION ZONE: LEG
LOCATION ZONE: FACE

## 2019-06-04 ENCOUNTER — OFFICE VISIT (OUTPATIENT)
Dept: MEDICAL GROUP | Facility: MEDICAL CENTER | Age: 64
End: 2019-06-04
Payer: COMMERCIAL

## 2019-06-04 VITALS
DIASTOLIC BLOOD PRESSURE: 70 MMHG | SYSTOLIC BLOOD PRESSURE: 110 MMHG | RESPIRATION RATE: 18 BRPM | TEMPERATURE: 97.2 F | OXYGEN SATURATION: 97 % | HEIGHT: 64 IN | BODY MASS INDEX: 25.03 KG/M2 | HEART RATE: 74 BPM | WEIGHT: 146.61 LBS

## 2019-06-04 DIAGNOSIS — G89.29 OTHER CHRONIC PAIN: ICD-10-CM

## 2019-06-04 DIAGNOSIS — M25.511 CHRONIC RIGHT SHOULDER PAIN: ICD-10-CM

## 2019-06-04 DIAGNOSIS — G89.29 CHRONIC RIGHT SHOULDER PAIN: ICD-10-CM

## 2019-06-04 DIAGNOSIS — I10 ESSENTIAL HYPERTENSION: ICD-10-CM

## 2019-06-04 DIAGNOSIS — E78.2 MIXED HYPERLIPIDEMIA: ICD-10-CM

## 2019-06-04 DIAGNOSIS — Z00.00 PREVENTATIVE HEALTH CARE: ICD-10-CM

## 2019-06-04 PROCEDURE — 99214 OFFICE O/P EST MOD 30 MIN: CPT | Performed by: PHYSICIAN ASSISTANT

## 2019-06-04 RX ORDER — HYDROCODONE BITARTRATE AND ACETAMINOPHEN 5; 325 MG/1; MG/1
1 TABLET ORAL EVERY 4 HOURS PRN
Qty: 30 TAB | Refills: 0 | Status: SHIPPED | OUTPATIENT
Start: 2019-06-04 | End: 2019-06-11

## 2019-06-04 NOTE — ASSESSMENT & PLAN NOTE
Pt has known HTN, controlled with current medicines. She denies HA, blurred vision, dizziness, shortness of breath, palpitations, or chest pain, lower extremity edema.

## 2019-06-04 NOTE — PROGRESS NOTES
Subjective:   CC:   Chief Complaint   Patient presents with   • Medication Refill   • Referral Needed     shoulder   • Orders Needed       Amina Wallace is a 63 y.o. female here today for f/u:    Chronic right shoulder pain  She continues to have right shoulder pain which is treated with Norco as needed.  She has tried tramadol and other NSAIDs in the past without good control of the pain.  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  She would like to see an Ortho to start the process.    HTN (hypertension)  Pt has known HTN, controlled with current medicines. She denies HA, blurred vision, dizziness, shortness of breath, palpitations, or chest pain, lower extremity edema.      Hyperlipidemia  Currently on atorvastatin 40 mg qd w/o any SE or mylagia.  Denies SOP, CP           Current medicines (including changes today)  Current Outpatient Prescriptions   Medication Sig Dispense Refill   • HYDROcodone-acetaminophen (NORCO) 5-325 MG Tab per tablet Take 1 Tab by mouth every four hours as needed for up to 7 days. 30 Tab 0   • lisinopril (PRINIVIL, ZESTRIL) 40 MG tablet Take 1 Tab by mouth every day. 30 Tab 11   • hydroCHLOROthiazide (HYDRODIURIL) 25 MG Tab Take 1 Tab by mouth every day. 30 Tab 11   • atorvastatin (LIPITOR) 40 MG Tab Take 1 Tab by mouth every day. 30 Tab 11   • estradiol (ESTRACE) 0.1 MG/GM vaginal cream Insert 0.5 g in vagina every day. (Patient not taking: Reported on 1/8/2019) 1 Tube 1   • meloxicam (MOBIC) 7.5 MG Tab Take 2 Tabs by mouth every day. 60 Tab 1     No current facility-administered medications for this visit.          Past medical, surgical, family, and social history are reviewed in Epic chart by me today.   Medications and allergies reviewed in Epic chart by me today.         ROS   No chest pain, no shortness of breath, no abdominal pain  As documented in history of present illness above     Objective:  "    /70 (Patient Position: Sitting)   Pulse 74   Temp 36.2 °C (97.2 °F) (Temporal)   Resp 18   Ht 1.626 m (5' 4\")   Wt 66.5 kg (146 lb 9.7 oz)   SpO2 97%  Body mass index is 25.16 kg/m².   Physical Exam:  Constitutional: Alert, oriented in no acute distress.  Psych: Eye contact is good, speech goal directed, affect calm  Eyes: Conjunctiva non-injected, sclera non-icteric.  Lungs: Unlabored respiratory effort, clear to auscultation bilaterally with good excursion, no wheez or rhonci  CV: regular rate and rhythm.       Assessment and Plan:   The following treatment plan was discussed    1. Chronic right shoulder pain  I have reviewed the Prescription Monitoring Program () today    - REFERRAL TO ORTHOPEDICS  - HYDROcodone-acetaminophen (NORCO) 5-325 MG Tab per tablet; Take 1 Tab by mouth every four hours as needed for up to 7 days.  Dispense: 30 Tab; Refill: 0    2. Essential hypertension  Controlled, continue current meds  - CBC WITH DIFFERENTIAL; Future  - Comp Metabolic Panel; Future    3. Mixed hyperlipidemia    - Lipid Profile; Future    4. Other chronic pain    - HYDROcodone-acetaminophen (NORCO) 5-325 MG Tab per tablet; Take 1 Tab by mouth every four hours as needed for up to 7 days.  Dispense: 30 Tab; Refill: 0    5. Preventative health care    - TSH WITH REFLEX TO FT4; Future    Unfortunately last time pt didn't tolerate Pap smear due to vaginal atrophy and dryness.  She was prescribed Estrace however it was too expensive $300 and pt couldn't buy.  Patient was seen by Dr. Dolan in January who offered to redo her Pap.  Discussed this matter with patient and advised to try that since every provider has a different touch and  is great with women health and very gentle.   Patient states she is currently concerned with her right shoulder.  She will follow-up with Dr. Dolan in future for Pap.      Followup: Return if symptoms worsen or fail to improve.         Please note that this " dictation was created using voice recognition software. I have made every reasonable attempt to correct obvious errors, but I expect that there are errors of grammar and possibly content that I did not discover before finalizing the note.

## 2019-06-04 NOTE — ASSESSMENT & PLAN NOTE
She continues to have right shoulder pain which is treated with Norco as needed.  She has tried tramadol and other NSAIDs in the past without good control of the pain.  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  She would like to see an Ortho to start the process.

## 2019-08-30 DIAGNOSIS — E78.2 MIXED HYPERLIPIDEMIA: ICD-10-CM

## 2019-08-30 DIAGNOSIS — Z00.00 PREVENTATIVE HEALTH CARE: ICD-10-CM

## 2019-08-30 DIAGNOSIS — I10 ESSENTIAL HYPERTENSION: ICD-10-CM

## 2019-09-17 ENCOUNTER — OFFICE VISIT (OUTPATIENT)
Dept: MEDICAL GROUP | Facility: MEDICAL CENTER | Age: 64
End: 2019-09-17
Payer: COMMERCIAL

## 2019-09-17 VITALS
DIASTOLIC BLOOD PRESSURE: 78 MMHG | RESPIRATION RATE: 18 BRPM | WEIGHT: 146.39 LBS | BODY MASS INDEX: 24.99 KG/M2 | HEART RATE: 70 BPM | HEIGHT: 64 IN | OXYGEN SATURATION: 97 % | TEMPERATURE: 97.6 F | SYSTOLIC BLOOD PRESSURE: 120 MMHG

## 2019-09-17 DIAGNOSIS — M25.511 CHRONIC RIGHT SHOULDER PAIN: ICD-10-CM

## 2019-09-17 DIAGNOSIS — E78.2 MIXED HYPERLIPIDEMIA: ICD-10-CM

## 2019-09-17 DIAGNOSIS — G89.29 CHRONIC RIGHT SHOULDER PAIN: ICD-10-CM

## 2019-09-17 DIAGNOSIS — Z23 NEED FOR VACCINATION: ICD-10-CM

## 2019-09-17 DIAGNOSIS — G89.29 OTHER CHRONIC PAIN: ICD-10-CM

## 2019-09-17 PROCEDURE — 90471 IMMUNIZATION ADMIN: CPT | Performed by: PHYSICIAN ASSISTANT

## 2019-09-17 PROCEDURE — 90686 IIV4 VACC NO PRSV 0.5 ML IM: CPT | Performed by: PHYSICIAN ASSISTANT

## 2019-09-17 PROCEDURE — 99214 OFFICE O/P EST MOD 30 MIN: CPT | Mod: 25 | Performed by: PHYSICIAN ASSISTANT

## 2019-09-17 RX ORDER — HYDROCODONE BITARTRATE AND ACETAMINOPHEN 5; 325 MG/1; MG/1
1 TABLET ORAL EVERY 4 HOURS PRN
Qty: 30 TAB | Refills: 0 | Status: SHIPPED | OUTPATIENT
Start: 2019-09-17 | End: 2019-09-24

## 2019-09-17 RX ORDER — ATORVASTATIN CALCIUM 80 MG/1
80 TABLET, FILM COATED ORAL EVERY EVENING
Qty: 30 TAB | Refills: 11 | Status: SHIPPED | OUTPATIENT
Start: 2019-09-17 | End: 2020-10-13 | Stop reason: SDUPTHER

## 2019-09-17 SDOH — HEALTH STABILITY: MENTAL HEALTH: HOW MANY STANDARD DRINKS CONTAINING ALCOHOL DO YOU HAVE ON A TYPICAL DAY?: 1 OR 2

## 2019-09-17 SDOH — HEALTH STABILITY: MENTAL HEALTH: HOW OFTEN DO YOU HAVE A DRINK CONTAINING ALCOHOL?: 2-3 TIMES A WEEK

## 2019-09-17 SDOH — HEALTH STABILITY: MENTAL HEALTH: HOW OFTEN DO YOU HAVE 6 OR MORE DRINKS ON ONE OCCASION?: NEVER

## 2019-09-17 NOTE — PROGRESS NOTES
Subjective:   CC:   Chief Complaint   Patient presents with   • Medication Refill   • Immunizations       Amina Wallace is a 63 y.o. female here today for f/u:    Patient has chronic shoulder pain and continues on tramadol 50 mg as needed.  She does not take medicine daily.  She was seen by Ortho and had the injection in her right shoulder which per patient helped for only a month.  She was recommended to repeat the injection one more time.  Patient is not ready to do surgery, she is currently taking care of her elderly mom and has cut back on working, patient is a  in [email or web address].    Patient has Hypercholesterolemia, currently taking atorvastatin 40 mg daily without any SE or myalgias.  She patient continues to have elevated cholesterol.    Current medicines (including changes today)  Current Outpatient Medications   Medication Sig Dispense Refill   • HYDROcodone-acetaminophen (NORCO) 5-325 MG Tab per tablet Take 1 Tab by mouth every four hours as needed for up to 7 days. 30 Tab 0   • atorvastatin (LIPITOR) 80 MG tablet Take 1 Tab by mouth every evening. 30 Tab 11   • lisinopril (PRINIVIL, ZESTRIL) 40 MG tablet Take 1 Tab by mouth every day. 30 Tab 11   • hydroCHLOROthiazide (HYDRODIURIL) 25 MG Tab Take 1 Tab by mouth every day. 30 Tab 11   • atorvastatin (LIPITOR) 40 MG Tab Take 1 Tab by mouth every day. 30 Tab 11   • meloxicam (MOBIC) 7.5 MG Tab Take 2 Tabs by mouth every day. 60 Tab 1   • estradiol (ESTRACE) 0.1 MG/GM vaginal cream Insert 0.5 g in vagina every day. (Patient not taking: Reported on 1/8/2019) 1 Tube 1     No current facility-administered medications for this visit.          Past medical, surgical, family, and social history are reviewed in Epic chart by me today.   Medications and allergies reviewed in Epic chart by me today.         ROS   As documented in history of present illness above     Objective:     /78 (BP Location: Right arm, Patient Position: Sitting, BP Cuff Size: Adult)  "  Pulse 70   Temp 36.4 °C (97.6 °F) (Temporal)   Resp 18   Ht 1.626 m (5' 4\")   Wt 66.4 kg (146 lb 6.2 oz)   SpO2 97%  Body mass index is 25.13 kg/m².   Physical Exam:  Constitutional: Alert, oriented in no acute distress.  Psych: Eye contact is good, speech goal directed, affect calm  Eyes: Conjunctiva non-injected, sclera non-icteric.  ENMT: Ears:Pinna normal. TM pearly gray.   MS: Unable to lift right arm above shoulder      Assessment and Plan:   The following treatment plan was discussed    1. Chronic right shoulder pain   was verified by me today  - HYDROcodone-acetaminophen (NORCO) 5-325 MG Tab per tablet; Take 1 Tab by mouth every four hours as needed for up to 7 days.  Dispense: 30 Tab; Refill: 0    2. Other chronic pain    - HYDROcodone-acetaminophen (NORCO) 5-325 MG Tab per tablet; Take 1 Tab by mouth every four hours as needed for up to 7 days.  Dispense: 30 Tab; Refill: 0    3. Need for vaccination    - Influenza Vaccine Quad Injection (PF)    4. Mixed hyperlipidemia  Increasing atorvastatin dose from 40-- > 80 mg  - atorvastatin (LIPITOR) 80 MG tablet; Take 1 Tab by mouth every evening.  Dispense: 30 Tab; Refill: 11  - Lipid Profile; Future      Followup: Return in about 3 months (around 12/17/2019).         Please note that this dictation was created using voice recognition software. I have made every reasonable attempt to correct obvious errors, but I expect that there are errors of grammar and possibly content that I did not discover before finalizing the note.    "

## 2019-09-24 DIAGNOSIS — I10 ESSENTIAL HYPERTENSION: ICD-10-CM

## 2019-09-24 RX ORDER — HYDROCHLOROTHIAZIDE 25 MG/1
TABLET ORAL
Qty: 90 TAB | Refills: 3 | Status: SHIPPED | OUTPATIENT
Start: 2019-09-24 | End: 2020-10-13 | Stop reason: SDUPTHER

## 2019-09-24 RX ORDER — LISINOPRIL 40 MG/1
TABLET ORAL
Qty: 90 TAB | Refills: 3 | Status: SHIPPED | OUTPATIENT
Start: 2019-09-24 | End: 2020-10-13 | Stop reason: SDUPTHER

## 2020-10-13 ENCOUNTER — OFFICE VISIT (OUTPATIENT)
Dept: MEDICAL GROUP | Facility: MEDICAL CENTER | Age: 65
End: 2020-10-13
Payer: MEDICARE

## 2020-10-13 VITALS
SYSTOLIC BLOOD PRESSURE: 102 MMHG | WEIGHT: 136.6 LBS | HEART RATE: 84 BPM | HEIGHT: 64 IN | BODY MASS INDEX: 23.32 KG/M2 | OXYGEN SATURATION: 96 % | RESPIRATION RATE: 16 BRPM | DIASTOLIC BLOOD PRESSURE: 78 MMHG | TEMPERATURE: 97.5 F

## 2020-10-13 DIAGNOSIS — Z12.83 SKIN CANCER SCREENING: ICD-10-CM

## 2020-10-13 DIAGNOSIS — M95.8 ACQUIRED DEFORMITY OF CLAVICLE: ICD-10-CM

## 2020-10-13 DIAGNOSIS — Z23 NEED FOR VACCINATION: ICD-10-CM

## 2020-10-13 DIAGNOSIS — E78.2 MIXED HYPERLIPIDEMIA: ICD-10-CM

## 2020-10-13 DIAGNOSIS — G89.29 CHRONIC RIGHT SHOULDER PAIN: ICD-10-CM

## 2020-10-13 DIAGNOSIS — Z12.31 ENCOUNTER FOR SCREENING MAMMOGRAM FOR MALIGNANT NEOPLASM OF BREAST: ICD-10-CM

## 2020-10-13 DIAGNOSIS — M25.511 CHRONIC RIGHT SHOULDER PAIN: ICD-10-CM

## 2020-10-13 DIAGNOSIS — I10 ESSENTIAL HYPERTENSION: ICD-10-CM

## 2020-10-13 PROCEDURE — G0009 ADMIN PNEUMOCOCCAL VACCINE: HCPCS | Performed by: PHYSICIAN ASSISTANT

## 2020-10-13 PROCEDURE — 90732 PPSV23 VACC 2 YRS+ SUBQ/IM: CPT | Performed by: PHYSICIAN ASSISTANT

## 2020-10-13 PROCEDURE — 99214 OFFICE O/P EST MOD 30 MIN: CPT | Mod: 25 | Performed by: PHYSICIAN ASSISTANT

## 2020-10-13 PROCEDURE — 90662 IIV NO PRSV INCREASED AG IM: CPT | Performed by: PHYSICIAN ASSISTANT

## 2020-10-13 PROCEDURE — G0008 ADMIN INFLUENZA VIRUS VAC: HCPCS | Performed by: PHYSICIAN ASSISTANT

## 2020-10-13 RX ORDER — HYDROCODONE BITARTRATE AND ACETAMINOPHEN 5; 325 MG/1; MG/1
1 TABLET ORAL
Qty: 20 TAB | Refills: 0 | Status: SHIPPED | OUTPATIENT
Start: 2020-10-13 | End: 2020-11-12

## 2020-10-13 RX ORDER — HYDROCODONE BITARTRATE AND ACETAMINOPHEN 5; 325 MG/1; MG/1
TABLET ORAL
COMMUNITY
End: 2020-10-13 | Stop reason: SDUPTHER

## 2020-10-13 RX ORDER — ATORVASTATIN CALCIUM 80 MG/1
80 TABLET, FILM COATED ORAL EVERY EVENING
Qty: 30 TAB | Refills: 11 | Status: SHIPPED | OUTPATIENT
Start: 2020-10-13 | End: 2020-11-19 | Stop reason: SDUPTHER

## 2020-10-13 RX ORDER — LISINOPRIL 40 MG/1
40 TABLET ORAL DAILY
Qty: 90 TAB | Refills: 3 | Status: SHIPPED | OUTPATIENT
Start: 2020-10-13 | End: 2020-11-19 | Stop reason: SDUPTHER

## 2020-10-13 RX ORDER — HYDROCHLOROTHIAZIDE 25 MG/1
25 TABLET ORAL DAILY
Qty: 90 TAB | Refills: 3 | Status: SHIPPED | OUTPATIENT
Start: 2020-10-13 | End: 2021-06-03 | Stop reason: SDUPTHER

## 2020-10-13 ASSESSMENT — PATIENT HEALTH QUESTIONNAIRE - PHQ9: CLINICAL INTERPRETATION OF PHQ2 SCORE: 0

## 2020-10-13 NOTE — PROGRESS NOTES
Subjective:   CC:   Chief Complaint   Patient presents with   • Chronic Opiate Therapy     norco refill        Amina Wallace is a 65 y.o. female here today for follow-up    Pt has known HTN, controlled with current medicines. She denies HA, blurred vision, dizziness, shortness of breath, palpitations, or chest pain, lower extremity edema.    Continues to have the pain of right shoulder, currently only waitresses twice a week.  Takes Norco as needed.  20 pills lasts for over a year.   was checked by me today.      Patient has had a lump over her left left clavicle for over 3 years, not growing in size. no pain to palpation, no fevers chills nighttime sweats or change in weight.    Currently takes atorvastatin 80 mg daily, has elevated LDL and TG.  States she has been working on her diet and exercising since July however last labs does not show any improvement in lipid profile.  Family history of cardiac issues.    Current medicines (including changes today)  Current Outpatient Medications   Medication Sig Dispense Refill   • atorvastatin (LIPITOR) 80 MG tablet Take 1 Tab by mouth every evening. 30 Tab 11   • hydroCHLOROthiazide (HYDRODIURIL) 25 MG Tab Take 1 Tab by mouth every day. 90 Tab 3   • lisinopril (PRINIVIL) 40 MG tablet Take 1 Tab by mouth every day. 90 Tab 3   • HYDROcodone-acetaminophen (NORCO) 5-325 MG Tab per tablet Take 1 Tab by mouth 1 time daily as needed for up to 30 days. 20 Tab 0   • estradiol (ESTRACE) 0.1 MG/GM vaginal cream Insert 0.5 g in vagina every day. (Patient not taking: Reported on 1/8/2019) 1 Tube 1   • atorvastatin (LIPITOR) 40 MG Tab Take 1 Tab by mouth every day. (Patient not taking: Reported on 10/13/2020) 30 Tab 11   • meloxicam (MOBIC) 7.5 MG Tab Take 2 Tabs by mouth every day. (Patient not taking: Reported on 10/13/2020) 60 Tab 1     No current facility-administered medications for this visit.          Past medical, surgical, family, and social history are reviewed in Epic  "chart by me today.   Medications and allergies reviewed in Epic chart by me today.         ROS   No chest pain, no shortness of breath, no abdominal pain  As documented in history of present illness above     Objective:     /78 (BP Location: Right arm, Patient Position: Sitting)   Pulse 84   Temp 36.4 °C (97.5 °F) (Temporal)   Resp 16   Ht 1.626 m (5' 4\")   Wt 62 kg (136 lb 9.6 oz)   SpO2 96%  Body mass index is 23.45 kg/m².   Physical Exam:  Constitutional: Alert, oriented in no acute distress.  Psych: Eye contact is good, speech goal directed, affect calm  Eyes: Conjunctiva non-injected, sclera non-icteric.  Neck: No cervical or supraclavicular lymphadenopathy,Trachea midline, no thyromegaly, no masses  Lungs: Unlabored respiratory effort, clear to auscultation bilaterally with good excursion, no wheez or rhonci  CV: regular rate and rhythm. No lower extremity edema  Abdomen: soft, nontender, No CVAT  Skin: no lesions in visible areas.  Ext: no edema, color normal, vascularity normal, temperature normal        Assessment and Plan:   The following treatment plan was discussed    1. Need for vaccination    - Influenza Vaccine, High Dose (65+ Only)  - Pneumovax Vaccine (PPSV23)    2. Skin cancer screening    - REFERRAL TO DERMATOLOGY    3. Encounter for screening mammogram for malignant neoplasm of breast    - MA-SCREENING MAMMO BILAT W/CAD; Future    4. Mixed hyperlipidemia    - REFERRAL TO VASCULAR MEDICINE Reason for Referral? Lipids  - atorvastatin (LIPITOR) 80 MG tablet; Take 1 Tab by mouth every evening.  Dispense: 30 Tab; Refill: 11    5. Acquired deformity of clavicle    - DX-CLAVICLE LEFT; Future    6. Essential hypertension    - hydroCHLOROthiazide (HYDRODIURIL) 25 MG Tab; Take 1 Tab by mouth every day.  Dispense: 90 Tab; Refill: 3  - lisinopril (PRINIVIL) 40 MG tablet; Take 1 Tab by mouth every day.  Dispense: 90 Tab; Refill: 3    7. Chronic right shoulder pain    - HYDROcodone-acetaminophen " (NORCO) 5-325 MG Tab per tablet; Take 1 Tab by mouth 1 time daily as needed for up to 30 days.  Dispense: 20 Tab; Refill: 0      Followup: prn          Please note that this dictation was created using voice recognition software. I have made every reasonable attempt to correct obvious errors, but I expect that there are errors of grammar and possibly content that I did not discover before finalizing the note.

## 2020-10-15 ENCOUNTER — TELEPHONE (OUTPATIENT)
Dept: VASCULAR LAB | Facility: MEDICAL CENTER | Age: 65
End: 2020-10-15

## 2020-10-15 ENCOUNTER — TELEPHONE (OUTPATIENT)
Dept: MEDICAL GROUP | Facility: MEDICAL CENTER | Age: 65
End: 2020-10-15

## 2020-10-15 NOTE — TELEPHONE ENCOUNTER
1. Caller Name: Amina Wallace                          Call Back Number: [phone] (home)       How would the patient prefer to be contacted with a response: Phone call do NOT leave a detailed message    Pt called requesting if you can re-order her mammogram test to a diagnostic mammogram instead . Felicitas,please review.

## 2020-10-15 NOTE — TELEPHONE ENCOUNTER
Per MB - need to call pt to schedule initial visit with Sean when available but referral in [facility] is incomplete so unable to schedule right now. Spoke w/ CHACORTA MATSON office who will fix referral.

## 2020-10-16 NOTE — TELEPHONE ENCOUNTER
"Pt stated due to having \"larger\" breast and history of dense breast she would like a more \"precise\" mammogram done.  "

## 2020-10-29 ENCOUNTER — TELEPHONE (OUTPATIENT)
Dept: HEALTH INFORMATION MANAGEMENT | Facility: OTHER | Age: 65
End: 2020-10-29

## 2020-10-29 NOTE — TELEPHONE ENCOUNTER
Good evening Felicitas,    The patient called to schedule her mammogram. She stated that she has large dense breast and is requesting a diagnostic mammogram due to her dense breast. I let her know I would request the order for a diagnostic. I verified if she meant whole breast but she stated diagnostic mammogram.    Thank you.

## 2020-11-03 DIAGNOSIS — R92.30 DENSE BREAST TISSUE ON MAMMOGRAM: ICD-10-CM

## 2020-11-03 NOTE — TELEPHONE ENCOUNTER
Phone Number Called: [phone] (home)       Call outcome: Did not leave a detailed message. Requested patient to call back.    Message: ROOSEVELTM stating it is about results we had received and for her to call us back.

## 2020-11-05 ENCOUNTER — TELEPHONE (OUTPATIENT)
Dept: VASCULAR LAB | Facility: MEDICAL CENTER | Age: 65
End: 2020-11-05

## 2020-11-06 NOTE — TELEPHONE ENCOUNTER
Phone Number Called: [phone] (home)     Call outcome: Did not leave a detailed message. Requested patient to call back.    Message: 2nd vm left for pt to cb to discuss orders.

## 2020-11-12 ENCOUNTER — TELEPHONE (OUTPATIENT)
Dept: VASCULAR LAB | Facility: MEDICAL CENTER | Age: 65
End: 2020-11-12

## 2020-11-19 DIAGNOSIS — E78.2 MIXED HYPERLIPIDEMIA: ICD-10-CM

## 2020-11-19 DIAGNOSIS — I10 ESSENTIAL HYPERTENSION: ICD-10-CM

## 2020-11-19 NOTE — TELEPHONE ENCOUNTER
Request for 100 day supply.    Future Appointments       Provider Department Center    11/30/2020 2:20 PM Poonam Calhoun M.D. [facility]

## 2020-11-20 RX ORDER — ATORVASTATIN CALCIUM 80 MG/1
80 TABLET, FILM COATED ORAL EVERY EVENING
Qty: 100 TAB | Refills: 0 | Status: SHIPPED | OUTPATIENT
Start: 2020-11-20 | End: 2021-06-03 | Stop reason: SDUPTHER

## 2020-11-20 RX ORDER — LISINOPRIL 40 MG/1
40 TABLET ORAL DAILY
Qty: 100 TAB | Refills: 0 | Status: SHIPPED | OUTPATIENT
Start: 2020-11-20 | End: 2021-06-03 | Stop reason: SDUPTHER

## 2020-11-30 ENCOUNTER — OFFICE VISIT (OUTPATIENT)
Dept: MEDICAL GROUP | Facility: MEDICAL CENTER | Age: 65
End: 2020-11-30
Payer: MEDICARE

## 2020-11-30 VITALS
SYSTOLIC BLOOD PRESSURE: 104 MMHG | HEART RATE: 95 BPM | HEIGHT: 64 IN | OXYGEN SATURATION: 97 % | DIASTOLIC BLOOD PRESSURE: 68 MMHG | RESPIRATION RATE: 18 BRPM | WEIGHT: 139.6 LBS | TEMPERATURE: 98.6 F | BODY MASS INDEX: 23.83 KG/M2

## 2020-11-30 DIAGNOSIS — Z78.0 POST-MENOPAUSAL: ICD-10-CM

## 2020-11-30 DIAGNOSIS — R92.30 DENSE BREAST TISSUE ON MAMMOGRAM: ICD-10-CM

## 2020-11-30 DIAGNOSIS — E78.2 MIXED HYPERLIPIDEMIA: ICD-10-CM

## 2020-11-30 DIAGNOSIS — G89.29 CHRONIC RIGHT SHOULDER PAIN: ICD-10-CM

## 2020-11-30 DIAGNOSIS — Z12.31 ENCOUNTER FOR SCREENING MAMMOGRAM FOR MALIGNANT NEOPLASM OF BREAST: ICD-10-CM

## 2020-11-30 DIAGNOSIS — I10 ESSENTIAL HYPERTENSION: ICD-10-CM

## 2020-11-30 DIAGNOSIS — R01.1 NEWLY RECOGNIZED HEART MURMUR: ICD-10-CM

## 2020-11-30 DIAGNOSIS — Z12.11 COLON CANCER SCREENING: ICD-10-CM

## 2020-11-30 DIAGNOSIS — M19.011 LOCALIZED PRIMARY OSTEOARTHRITIS OF RIGHT SHOULDER REGION: ICD-10-CM

## 2020-11-30 DIAGNOSIS — M25.511 CHRONIC RIGHT SHOULDER PAIN: ICD-10-CM

## 2020-11-30 DIAGNOSIS — Z11.59 NEED FOR HEPATITIS C SCREENING TEST: ICD-10-CM

## 2020-11-30 PROBLEM — M19.019 LOCALIZED, PRIMARY OSTEOARTHRITIS OF SHOULDER REGION: Status: ACTIVE | Noted: 2019-06-25

## 2020-11-30 PROBLEM — N64.4 BREAST PAIN IN FEMALE: Status: RESOLVED | Noted: 2019-01-08 | Resolved: 2020-11-30

## 2020-11-30 PROCEDURE — 99214 OFFICE O/P EST MOD 30 MIN: CPT | Performed by: FAMILY MEDICINE

## 2020-11-30 RX ORDER — HYDROCODONE BITARTRATE AND ACETAMINOPHEN 5; 325 MG/1; MG/1
1 TABLET ORAL EVERY 4 HOURS PRN
COMMUNITY
End: 2021-09-22 | Stop reason: SDUPTHER

## 2020-11-30 ASSESSMENT — ENCOUNTER SYMPTOMS
FEVER: 0
BLURRED VISION: 0
CONSTIPATION: 0
WEAKNESS: 0
CHILLS: 0
DIARRHEA: 0
PALPITATIONS: 0
SHORTNESS OF BREATH: 0
DEPRESSION: 0
NAUSEA: 0
VOMITING: 0

## 2020-11-30 NOTE — PROGRESS NOTES
History of Present Illness  65 year old female presents to clinic to establish care. She has a history of hypertension for which she takes hydrochlorothiazide and lisinopril.  This is a chronic and stable problem.  She denies any chest pain, shortness of breath, headaches, or change in vision.     She has a history of dyslipidemia, she is on a statin, which was recently changed to high intensity dose.  She is doing well with this, and has no myalgias.  Labs from August 2020 show an ASCVD score of 5.3%.    She also has a history of chronic right shoulder pain.  She did previously follow with orthopedics, Dr. Dickens did an arthroscopic surgery, where she was apparently told that she had osteoarthritis.  She has been using Norco 5-3 25 for many years due to this pain.  She takes 1/2 tablet approximately 2 times per week (usually on Friday and Saturday night, as she works as a  and carrying the heavy plates exacerbates her pain).  She does also have an injection to her right shoulder last year, which provided minimal relief.    She denies any other questions or concerns at this time.    ROS  Review of Systems   Constitutional: Negative for chills and fever.   HENT: Negative for hearing loss.    Eyes: Negative for blurred vision.   Respiratory: Negative for shortness of breath.    Cardiovascular: Negative for chest pain and palpitations.   Gastrointestinal: Negative for constipation, diarrhea, nausea and vomiting.   Genitourinary: Negative for dysuria and hematuria.   Skin: Negative for rash.   Neurological: Negative for weakness.   Psychiatric/Behavioral: Negative for depression.     Medications  Current Outpatient Medications   Medication Sig Dispense Refill   • HYDROcodone-acetaminophen (NORCO) 5-325 MG Tab per tablet Take 1 Tab by mouth every four hours as needed.     • lisinopril (PRINIVIL) 40 MG tablet Take 1 Tab by mouth every day. 100 Tab 0   • atorvastatin (LIPITOR) 80 MG tablet Take 1 Tab by mouth every  "evening. 100 Tab 0   • hydroCHLOROthiazide (HYDRODIURIL) 25 MG Tab Take 1 Tab by mouth every day. 90 Tab 3     No current facility-administered medications for this visit.      Allergies  Allergies   Allergen Reactions   • Penicillins      Problem List  Patient Active Problem List   Diagnosis   • HTN (hypertension)   • Hyperlipidemia   • Chronic right shoulder pain   • Localized, primary osteoarthritis of shoulder region     Past Medical History  Past Medical History:   Diagnosis Date   • Hyperlipidemia 3/19/2012   • Hypertension      Past Surgical History  Past Surgical History:   Procedure Laterality Date   • KNEE ARTHROPLASTY TOTAL       Past Family History  Family History   Problem Relation Age of Onset   • Cancer Mother         unknown    • Parkinson's Disease Father    • Hypertension Father    • Hyperlipidemia Father    • No Known Problems Brother      Social History  She reports eating a healthy and balanced diet, as well as getting regular exercise. She works part time as a  at [employer]. She drinks an average of 14 alcoholic beverages per week. She denies any tobacco product or illicit drug use. She is not currently sexually active.    Physical Exam  /68 (BP Location: Left arm, Patient Position: Sitting, BP Cuff Size: Adult)   Pulse 95   Temp 37 °C (98.6 °F) (Temporal)   Resp 18   Ht 1.626 m (5' 4\")   Wt 63.3 kg (139 lb 9.6 oz)   SpO2 97%   BMI 23.96 kg/m²   Physical Exam   Constitutional: She is well-developed, well-nourished, and in no distress. No distress.   HENT:   Head: Normocephalic and atraumatic.   Right Ear: Tympanic membrane, external ear and ear canal normal.   Left Ear: Tympanic membrane, external ear and ear canal normal.   Eyes: Pupils are equal, round, and reactive to light. Right eye exhibits no discharge. Left eye exhibits no discharge. No scleral icterus.   Neck: No thyromegaly present.   Cardiovascular: Normal rate and regular rhythm.   Murmur (systolic) " heard.  Pulmonary/Chest: Effort normal and breath sounds normal. No respiratory distress.   Abdominal: Soft. Bowel sounds are normal. She exhibits no distension. There is no abdominal tenderness.   Musculoskeletal:         General: No edema.   Neurological: She is alert.   Skin: Skin is warm and dry. She is not diaphoretic.   Psychiatric: Affect and judgment normal.     Assessment & Plan  1. Colon cancer screening  We will update colon cancer screening.  - REFERRAL TO GI FOR COLONOSCOPY    2. Post-menopausal  We will update osteoporosis screening.  - DS-BONE DENSITY STUDY (DEXA); Future    3. Encounter for screening mammogram for malignant neoplasm of breast  4. Dense breast tissue on mammogram  We will update breast cancer screening.  - MA DIAGNOSTIC MAMMO BILAT W/CAD; Future    5. Need for hepatitis C screening test  She does need hepatitis C screening, this has been ordered.  - HCV Scrn ( [id number] 1xLife); Future    6. Newly recognized heart murmur  She does have a systolic murmur on exam today, she has not previously been told about this murmur.  We will get an echo to evaluate this further.  - EC-ECHOCARDIOGRAM COMPLETE W/ CONT; Future    7. Essential hypertension  8. Mixed hyperlipidemia  This problem is new to me, however is chronic and stable.  We will continue the same medications.    9. Chronic right shoulder pain  10. Localized primary osteoarthritis of right shoulder region  This problem is new to me, however is chronic and stable.  PDMP was reviewed today.  She has plenty of Norco left over and does not need a refill.  We will follow-up in 6 months for this, at that time we will do the controlled substance agreement and drug screening.    Return in about 6 months (around 2021) for Medicare annual wellness & pain management .    Poonam Calhoun M.D.

## 2020-11-30 NOTE — PATIENT INSTRUCTIONS
Please check with your insurance to see where your shingles vaccine is covered best (in the clinic or at the pharmacy).

## 2020-12-28 ENCOUNTER — APPOINTMENT (OUTPATIENT)
Dept: RADIOLOGY | Facility: MEDICAL CENTER | Age: 65
End: 2020-12-28
Attending: FAMILY MEDICINE
Payer: MEDICARE

## 2021-01-04 ENCOUNTER — HOSPITAL ENCOUNTER (OUTPATIENT)
Dept: RADIOLOGY | Facility: MEDICAL CENTER | Age: 66
End: 2021-01-04
Attending: FAMILY MEDICINE
Payer: MEDICARE

## 2021-01-04 DIAGNOSIS — Z78.0 POST-MENOPAUSAL: ICD-10-CM

## 2021-01-04 PROCEDURE — 77080 DXA BONE DENSITY AXIAL: CPT

## 2021-01-07 ENCOUNTER — HOSPITAL ENCOUNTER (OUTPATIENT)
Dept: RADIOLOGY | Facility: MEDICAL CENTER | Age: 66
End: 2021-01-07
Attending: FAMILY MEDICINE
Payer: MEDICARE

## 2021-01-07 DIAGNOSIS — R92.30 DENSE BREAST TISSUE ON MAMMOGRAM: ICD-10-CM

## 2021-01-07 DIAGNOSIS — Z12.31 ENCOUNTER FOR SCREENING MAMMOGRAM FOR MALIGNANT NEOPLASM OF BREAST: ICD-10-CM

## 2021-01-07 PROCEDURE — 77063 BREAST TOMOSYNTHESIS BI: CPT

## 2021-01-18 ENCOUNTER — HOSPITAL ENCOUNTER (OUTPATIENT)
Dept: CARDIOLOGY | Facility: MEDICAL CENTER | Age: 66
End: 2021-01-18
Attending: FAMILY MEDICINE
Payer: MEDICARE

## 2021-01-18 DIAGNOSIS — R01.1 NEWLY RECOGNIZED HEART MURMUR: ICD-10-CM

## 2021-01-18 LAB
LV EJECT FRACT  99904: 75
LV EJECT FRACT MOD 2C 99903: 73.78
LV EJECT FRACT MOD 4C 99902: 74.08
LV EJECT FRACT MOD BP 99901: 73.9

## 2021-01-18 PROCEDURE — 93306 TTE W/DOPPLER COMPLETE: CPT

## 2021-01-18 PROCEDURE — 93306 TTE W/DOPPLER COMPLETE: CPT | Mod: 26 | Performed by: INTERNAL MEDICINE

## 2021-03-03 DIAGNOSIS — Z23 NEED FOR VACCINATION: ICD-10-CM

## 2021-04-02 ENCOUNTER — TELEPHONE (OUTPATIENT)
Dept: VASCULAR LAB | Facility: MEDICAL CENTER | Age: 66
End: 2021-04-02

## 2021-04-02 NOTE — TELEPHONE ENCOUNTER
I spoke with pt regarding scheduling an initial vascular medicine visit but she thought all of her issues were taken care of. She has an upcoming appointment with her referring provider and if indeed she needs to schedule she will call the clinic back.

## 2021-06-01 ENCOUNTER — HOSPITAL ENCOUNTER (OUTPATIENT)
Dept: LAB | Facility: MEDICAL CENTER | Age: 66
End: 2021-06-01
Attending: FAMILY MEDICINE
Payer: MEDICARE

## 2021-06-01 DIAGNOSIS — Z11.59 NEED FOR HEPATITIS C SCREENING TEST: ICD-10-CM

## 2021-06-01 LAB — HCV AB SER QL: NORMAL

## 2021-06-01 PROCEDURE — G0472 HEP C SCREEN HIGH RISK/OTHER: HCPCS

## 2021-06-01 PROCEDURE — 36415 COLL VENOUS BLD VENIPUNCTURE: CPT

## 2021-06-02 ENCOUNTER — PATIENT OUTREACH (OUTPATIENT)
Dept: MEDICAL GROUP | Facility: MEDICAL CENTER | Age: 66
End: 2021-06-02

## 2021-06-02 NOTE — PROGRESS NOTES
ANNUAL WELLNESS VISIT PRE-VISIT PLANNING    Patient NOT contacted to complete Annual Wellness Visit Pre-Visit Planning. Information was reviewed in chart.     1.  Reviewed notes from the last office visit: Yes    2.  If any orders were ordered or intended to be done prior to visit (i.e. 6 mos follow-up), do we have results/consult notes or has patient scheduled?   · Labs - Labs ordered, completed on 06/01/2021 and results are in chart.   Note: If patient appointment is for lab review and patient did not complete labs, check with provider if OK to reschedule patient until labs completed.  · Imaging - Imaging ordered, completed and results are in chart.  · Referrals - No referrals were ordered at last office visit.    3.  Immunizations were updated in Epic using Reconcile Outside Information activity? Yes. Immunizations reconciled through [employer] and outside sources. Immunization records are up to date.  · Is patient due for Tdap? NO  · Is patient due for Shingrix? Yes. An additional charge may apply to this immunization. Please contact [facility] Billing Department to estimate the cost of the vaccine with your insurance.    4.  Patient is due for the following Health Maintenance Topics:   Health Maintenance Due   Topic Date Due   • Annual Wellness Visit  Never done   • IMM ZOSTER VACCINES (2 of 3) 01/11/2016   • COLONOSCOPY  08/09/2020     5.  Reviewed/Updated the following:  · Preferred Pharmacy? Yes  · Preferred Lab? Yes  · Preferred Communication? Yes  · Allergies? Yes  · Medications? YES. Was Abstract Encounter opened and chart updated? NO  · Social History? Yes  · Family History (document living status of immediate family members and if + hx of  cancer, diabetes, hypertension, hyperlipidemia, heart attack, stroke) No    6.  Care Team Updated:  · DME Company (gait device, O2, CPAP, etc.): N\A  · Other Specialists (eye doctor, derm, GYN, cardiology, endo, etc): N\A    7.  Patient was not advised: “This is a free  wellness visit. The provider will screen for medical conditions to help you stay healthy. If you have other concerns to address you may be asked to discuss these at a separate visit or there may be an additional fee.”         ---------------------------------------------------------------------------------------------  This Pre-Visit Planning note has been created for the Provider and Medical Assistant to review prior to the patient's office appointment.   Patient is NOT REQUIRED to follow-up on this note.

## 2021-06-03 ENCOUNTER — OFFICE VISIT (OUTPATIENT)
Dept: MEDICAL GROUP | Facility: MEDICAL CENTER | Age: 66
End: 2021-06-03
Payer: MEDICARE

## 2021-06-03 VITALS
SYSTOLIC BLOOD PRESSURE: 100 MMHG | TEMPERATURE: 98 F | WEIGHT: 131.84 LBS | DIASTOLIC BLOOD PRESSURE: 62 MMHG | HEART RATE: 67 BPM | BODY MASS INDEX: 22.51 KG/M2 | OXYGEN SATURATION: 98 % | HEIGHT: 64 IN

## 2021-06-03 DIAGNOSIS — G89.29 CHRONIC RIGHT SHOULDER PAIN: ICD-10-CM

## 2021-06-03 DIAGNOSIS — I87.2 VENOUS INSUFFICIENCY OF LEFT LEG: ICD-10-CM

## 2021-06-03 DIAGNOSIS — E78.2 MIXED HYPERLIPIDEMIA: ICD-10-CM

## 2021-06-03 DIAGNOSIS — I10 ESSENTIAL HYPERTENSION: ICD-10-CM

## 2021-06-03 DIAGNOSIS — M25.511 CHRONIC RIGHT SHOULDER PAIN: ICD-10-CM

## 2021-06-03 PROCEDURE — G0438 PPPS, INITIAL VISIT: HCPCS | Performed by: FAMILY MEDICINE

## 2021-06-03 RX ORDER — LISINOPRIL 40 MG/1
40 TABLET ORAL DAILY
Qty: 100 TABLET | Refills: 3 | Status: SHIPPED | OUTPATIENT
Start: 2021-06-03 | End: 2022-01-10

## 2021-06-03 RX ORDER — HYDROCHLOROTHIAZIDE 25 MG/1
25 TABLET ORAL DAILY
Qty: 90 TABLET | Refills: 3 | Status: SHIPPED | OUTPATIENT
Start: 2021-06-03 | End: 2022-08-10 | Stop reason: SDUPTHER

## 2021-06-03 RX ORDER — ATORVASTATIN CALCIUM 80 MG/1
80 TABLET, FILM COATED ORAL EVERY EVENING
Qty: 100 TABLET | Refills: 3 | Status: SHIPPED | OUTPATIENT
Start: 2021-06-03 | End: 2022-01-10

## 2021-06-03 ASSESSMENT — PATIENT HEALTH QUESTIONNAIRE - PHQ9: CLINICAL INTERPRETATION OF PHQ2 SCORE: 0

## 2021-06-03 ASSESSMENT — ENCOUNTER SYMPTOMS: GENERAL WELL-BEING: EXCELLENT

## 2021-06-03 ASSESSMENT — ACTIVITIES OF DAILY LIVING (ADL): BATHING_REQUIRES_ASSISTANCE: 0

## 2021-06-03 NOTE — PROGRESS NOTES
No chief complaint on file.      HPI:  Amina Wallace is a 65 year old here for Medicare Annual Wellness Visit. She has a history of hypertension for which she takes hydrochlorothiazide and lisinopril.  She does not check her blood pressure regularly at home.  She denies any chest pain, palpitations, or shortness of breath.     She is taking atorvastatin for her dyslipidemia.  She denies any side effects, no myalgias or GI upset.      She also has a history of chronic right shoulder pain, for which she takes Norco 5-3 25. She takes 1/2 tablet approximately 2 times per week (usually on Friday and Saturday night, as she works as a  and carrying the heavy plates exacerbates her pain).      She has noticed worsening varicose veins in her left lower extremity, and is wondering if there is anything that can be done about this.    Patient Active Problem List    Diagnosis Date Noted   • Localized, primary osteoarthritis of shoulder region 06/25/2019   • Chronic right shoulder pain 09/07/2018   • HTN (hypertension) 03/19/2012   • Hyperlipidemia 03/19/2012     Current Outpatient Medications   Medication Sig Dispense Refill   • lisinopril (PRINIVIL) 40 MG tablet Take 1 tablet by mouth every day. 100 tablet 3   • hydroCHLOROthiazide (HYDRODIURIL) 25 MG Tab Take 1 tablet by mouth every day. 90 tablet 3   • atorvastatin (LIPITOR) 80 MG tablet Take 1 tablet by mouth every evening. 100 tablet 3   • HYDROcodone-acetaminophen (NORCO) 5-325 MG Tab per tablet Take 1 Tab by mouth every four hours as needed.       No current facility-administered medications for this visit.          Current supplements as per medication list.     Allergies: Penicillins    Current social contact/activities: working as a , gardening    She  reports that she has never smoked. She has never used smokeless tobacco. She reports current alcohol use. She reports that she does not use drugs.  Counseling given: Not Answered    DPA/Advanced  Directive:  Patient does not have an Advanced Directive.  A packet and workshop information was given on Advanced Directives.    ROS:    Gait: Uses no assistive device  Ostomy: No  Other tubes: No  Amputations: No  Chronic oxygen use: No  Last eye exam: 11/1/2018  Wears hearing aids: No   : Reports urinary leakage during the last 6 months that has not interfered at all with their daily activities or sleep.    Screening:    Depression Screening  Little interest or pleasure in doing things?  0 - not at all  Feeling down, depressed , or hopeless? 0 - not at all  Patient Health Questionnaire Score: 0     If depressive symptoms identified deferred to follow up visit unless specifically addressed in assessment and plan.    Interpretation of PHQ-9 Total Score   Score Severity   1-4 No Depression   5-9 Mild Depression   10-14 Moderate Depression   15-19 Moderately Severe Depression   20-27 Severe Depression    Screening for Cognitive Impairment  Three Minute Recall (captain, garden, picture) 3/3    Prieto clock face with all 12 numbers and set the hands to show 5 past 8.  Yes    Cognitive concerns identified deferred for follow up unless specifically addressed in assessment and plan.    Fall Risk Assessment  Has the patient had two or more falls in the last year or any fall with injury in the last year?  Yes    Safety Assessment  Throw rugs on floor.  Yes  Handrails on all stairs.  No  Good lighting in all hallways.  Yes  Difficulty hearing.  No  Patient counseled about all safety risks that were identified.    Functional Assessment ADLs  Are there any barriers preventing you from cooking for yourself or meeting nutritional needs?  No.    Are there any barriers preventing you from driving safely or obtaining transportation?  No.    Are there any barriers preventing you from using a telephone or calling for help?  No.    Are there any barriers preventing you from shopping?  No.    Are there any barriers preventing you from  "taking care of your own finances?  No.    Are there any barriers preventing you from managing your medications?  No.    Are there any barriers preventing you from showering, bathing or dressing yourself?  No.    Are you currently engaging in any exercise or physical activity?  Yes.     What is your perception of your health?  Excellent.      Health Maintenance Summary                IMM ZOSTER VACCINES Overdue 1/11/2016      Done 11/16/2015 Imm Admin: Zoster Vaccine Live (ZVL) (Zostavax) - HISTORICAL DATA    COLONOSCOPY Overdue 8/9/2020      Done 8/9/2010 REFERRAL TO GI FOR COLONOSCOPY    PAP SMEAR Next Due 10/10/2021      Done 10/10/2018 PAP IG (IMAGE GUIDED)     Patient has more history with this topic...    MAMMOGRAM Next Due 1/7/2022      Done 1/7/2021 MA-SCREENING MAMMO BILAT W/TOMOSYNTHESIS W/CAD     Patient has more history with this topic...    IMM DTaP/Tdap/Td Vaccine Next Due 3/10/2025      Done 3/10/2015 Imm Admin: Tdap Vaccine    BONE DENSITY Next Due 1/4/2026      Done 1/4/2021 DS-BONE DENSITY STUDY (DEXA)        Patient Care Team:  Poonam Calhoun M.D. as PCP - General (Family Medicine)    Social History     Tobacco Use   • Smoking status: Never Smoker   • Smokeless tobacco: Never Used   Vaping Use   • Vaping Use: Never used   Substance Use Topics   • Alcohol use: Yes     Comment: 2 glasses wine/day   • Drug use: No     Family History   Problem Relation Age of Onset   • Cancer Mother         unknown    • Parkinson's Disease Father    • Hypertension Father    • Hyperlipidemia Father    • No Known Problems Brother      She  has a past medical history of Hyperlipidemia (3/19/2012) and Hypertension.   Past Surgical History:   Procedure Laterality Date   • KNEE ARTHROPLASTY TOTAL       Exam:   /62 (BP Location: Left arm, Patient Position: Sitting, BP Cuff Size: Adult)   Pulse 67   Temp 36.7 °C (98 °F) (Temporal)   Ht 1.626 m (5' 4\")   Wt 59.8 kg (131 lb 13.4 oz)   SpO2 98%  Body mass index is " 22.63 kg/m².  Cardiovascular: Normal rate and regular rhythm.   Murmur (systolic) heard.  Pulmonary/Chest: Effort normal and breath sounds normal. No respiratory distress.   Abdominal: Soft. Bowel sounds are normal. She exhibits no distension. There is no abdominal tenderness.   Musculoskeletal:         General: No edema.   Hearing excellent.    Dentition fair  Alert, oriented in no acute distress.  Eye contact is good, speech goal directed, affect calm    Assessment and Plan. The following treatment and monitoring plan is recommended:    1. Essential hypertension  Chronic and stable.  Continue with lisinopril and hydrochlorothiazide.  - Comp Metabolic Panel; Future  - CBC WITH DIFFERENTIAL; Future  - lisinopril (PRINIVIL) 40 MG tablet; Take 1 tablet by mouth every day.  Dispense: 100 tablet; Refill: 3  - hydroCHLOROthiazide (HYDRODIURIL) 25 MG Tab; Take 1 tablet by mouth every day.  Dispense: 90 tablet; Refill: 3  - Initial Annual Wellness Visit - Includes PPPS ()    2. Mixed hyperlipidemia  Chronic and stable.  Continue with atorvastatin.  - Lipid Profile; Future  - atorvastatin (LIPITOR) 80 MG tablet; Take 1 tablet by mouth every evening.  Dispense: 100 tablet; Refill: 3  - Initial Annual Wellness Visit - Includes PPPS ()    3. Chronic right shoulder pain  Chronic condition, will refer to orthopedics to see if there are treatments other than opioids such as injections.  - REFERRAL TO ORTHOPEDICS    4. Venous insufficiency of left leg  Referral to vein clinic has been placed.  - REFERRAL TO VASCULAR SURGERY    Services suggested:  Health Care Screening: Age-appropriate preventive services recommended by USPTF and ACIP covered by Medicare were discussed today. Services ordered if indicated and agreed upon by the patient.  Referrals offered: Community-based lifestyle interventions to reduce health risks and promote self-management and wellness, fall prevention, nutrition, physical activity, tobacco-use  cessation, weight loss, and mental health services as per orders if indicated.    Discussion today about general wellness and lifestyle habits:    · Prevent falls and reduce trip hazards; Cautioned about securing or removing rugs.  · Have a working fire alarm and carbon monoxide detector;   · Engage in regular physical activity and social activities     Follow-up: Return in about 1 year (around 6/3/2022) for Medicare annual wellness.

## 2021-07-19 ENCOUNTER — OFFICE VISIT (OUTPATIENT)
Dept: MEDICAL GROUP | Facility: MEDICAL CENTER | Age: 66
End: 2021-07-19
Payer: MEDICARE

## 2021-07-19 VITALS
SYSTOLIC BLOOD PRESSURE: 120 MMHG | OXYGEN SATURATION: 96 % | DIASTOLIC BLOOD PRESSURE: 62 MMHG | HEIGHT: 64 IN | BODY MASS INDEX: 21.83 KG/M2 | WEIGHT: 127.87 LBS | HEART RATE: 65 BPM | TEMPERATURE: 97.6 F

## 2021-07-19 DIAGNOSIS — D22.9 ATYPICAL MOLE: ICD-10-CM

## 2021-07-19 DIAGNOSIS — Z23 NEED FOR VACCINATION: ICD-10-CM

## 2021-07-19 PROCEDURE — 90471 IMMUNIZATION ADMIN: CPT | Performed by: FAMILY MEDICINE

## 2021-07-19 PROCEDURE — 90750 HZV VACC RECOMBINANT IM: CPT | Performed by: FAMILY MEDICINE

## 2021-07-19 PROCEDURE — 99214 OFFICE O/P EST MOD 30 MIN: CPT | Mod: 25 | Performed by: FAMILY MEDICINE

## 2021-07-19 NOTE — PROGRESS NOTES
"History of Present Illness  65 year old female presents to clinic for new lesion on her back.  She states it first appeared several months ago, and is extremely itchy.  She is unable to see a mole, but states it is a difficult area for her to see.  She does normally go to dermatology every year for a skin check, but is looking for a new dermatologist, so is requesting a referral.    She denies any other questions or concerns at this time.    Current problem list, current medication, and past medical/surgical history were reviewed.     ROS  See HPI    Physical Exam  /62 (BP Location: Left arm, Patient Position: Sitting, BP Cuff Size: Adult)   Pulse 65   Temp 36.4 °C (97.6 °F) (Temporal)   Ht 1.626 m (5' 4\")   Wt 58 kg (127 lb 13.9 oz)   SpO2 96%   BMI 21.95 kg/m²   Physical Exam  Constitutional:       General: She is not in acute distress.     Appearance: She is not diaphoretic.   Cardiovascular:      Rate and Rhythm: Normal rate and regular rhythm.      Heart sounds: Normal heart sounds.   Pulmonary:      Effort: Pulmonary effort is normal. No respiratory distress.      Breath sounds: Normal breath sounds.   Abdominal:      General: Bowel sounds are normal.      Palpations: Abdomen is soft.   Skin:     General: Skin is warm and dry.      Comments: Circular, raised skin lesion to her mid, left back.  No erythema, warmth, or streaking noted.  No open lesions.   Neurological:      Mental Status: She is alert.   Psychiatric:         Mood and Affect: Affect normal.         Judgment: Judgment normal.       Assessment & Plan  1. Atypical mole  Referral has been placed to dermatology.  - REFERRAL TO DERMATOLOGY    2. Need for vaccination  Shingles vaccine is due, and has been given in clinic today.  - Shingles Vaccine (Shingrix)    Return in about 6 months (around 1/19/2022), or if symptoms worsen or fail to improve.    Poonam Calhoun M.D.   "

## 2021-08-30 ENCOUNTER — PATIENT MESSAGE (OUTPATIENT)
Dept: HEALTH INFORMATION MANAGEMENT | Facility: OTHER | Age: 66
End: 2021-08-30

## 2021-09-17 ENCOUNTER — HOSPITAL ENCOUNTER (OUTPATIENT)
Dept: LAB | Facility: MEDICAL CENTER | Age: 66
End: 2021-09-17
Attending: FAMILY MEDICINE
Payer: MEDICARE

## 2021-09-17 DIAGNOSIS — I10 ESSENTIAL HYPERTENSION: ICD-10-CM

## 2021-09-17 DIAGNOSIS — E78.2 MIXED HYPERLIPIDEMIA: ICD-10-CM

## 2021-09-17 LAB
ALBUMIN SERPL BCP-MCNC: 4.9 G/DL (ref 3.2–4.9)
ALBUMIN/GLOB SERPL: 1.4 G/DL
ALP SERPL-CCNC: 102 U/L (ref 30–99)
ALT SERPL-CCNC: 44 U/L (ref 2–50)
ANION GAP SERPL CALC-SCNC: 19 MMOL/L (ref 7–16)
AST SERPL-CCNC: 21 U/L (ref 12–45)
BASOPHILS # BLD AUTO: 0.8 % (ref 0–1.8)
BASOPHILS # BLD: 0.07 K/UL (ref 0–0.12)
BILIRUB SERPL-MCNC: 0.7 MG/DL (ref 0.1–1.5)
BUN SERPL-MCNC: 16 MG/DL (ref 8–22)
CALCIUM SERPL-MCNC: 10.3 MG/DL (ref 8.5–10.5)
CHLORIDE SERPL-SCNC: 99 MMOL/L (ref 96–112)
CHOLEST SERPL-MCNC: 263 MG/DL (ref 100–199)
CO2 SERPL-SCNC: 21 MMOL/L (ref 20–33)
CREAT SERPL-MCNC: 0.59 MG/DL (ref 0.5–1.4)
EOSINOPHIL # BLD AUTO: 0.14 K/UL (ref 0–0.51)
EOSINOPHIL NFR BLD: 1.6 % (ref 0–6.9)
ERYTHROCYTE [DISTWIDTH] IN BLOOD BY AUTOMATED COUNT: 45.9 FL (ref 35.9–50)
FASTING STATUS PATIENT QL REPORTED: NORMAL
GLOBULIN SER CALC-MCNC: 3.4 G/DL (ref 1.9–3.5)
GLUCOSE SERPL-MCNC: 90 MG/DL (ref 65–99)
HCT VFR BLD AUTO: 44.1 % (ref 37–47)
HDLC SERPL-MCNC: 107 MG/DL
HGB BLD-MCNC: 14.5 G/DL (ref 12–16)
IMM GRANULOCYTES # BLD AUTO: 0.04 K/UL (ref 0–0.11)
IMM GRANULOCYTES NFR BLD AUTO: 0.5 % (ref 0–0.9)
LDLC SERPL CALC-MCNC: 119 MG/DL
LYMPHOCYTES # BLD AUTO: 2.35 K/UL (ref 1–4.8)
LYMPHOCYTES NFR BLD: 26.7 % (ref 22–41)
MCH RBC QN AUTO: 32.2 PG (ref 27–33)
MCHC RBC AUTO-ENTMCNC: 32.9 G/DL (ref 33.6–35)
MCV RBC AUTO: 98 FL (ref 81.4–97.8)
MONOCYTES # BLD AUTO: 0.64 K/UL (ref 0–0.85)
MONOCYTES NFR BLD AUTO: 7.3 % (ref 0–13.4)
NEUTROPHILS # BLD AUTO: 5.56 K/UL (ref 2–7.15)
NEUTROPHILS NFR BLD: 63.1 % (ref 44–72)
NRBC # BLD AUTO: 0 K/UL
NRBC BLD-RTO: 0 /100 WBC
PLATELET # BLD AUTO: 370 K/UL (ref 164–446)
PMV BLD AUTO: 9.5 FL (ref 9–12.9)
POTASSIUM SERPL-SCNC: 4 MMOL/L (ref 3.6–5.5)
PROT SERPL-MCNC: 8.3 G/DL (ref 6–8.2)
RBC # BLD AUTO: 4.5 M/UL (ref 4.2–5.4)
SODIUM SERPL-SCNC: 139 MMOL/L (ref 135–145)
TRIGL SERPL-MCNC: 184 MG/DL (ref 0–149)
WBC # BLD AUTO: 8.8 K/UL (ref 4.8–10.8)

## 2021-09-17 PROCEDURE — 36415 COLL VENOUS BLD VENIPUNCTURE: CPT

## 2021-09-17 PROCEDURE — 80053 COMPREHEN METABOLIC PANEL: CPT

## 2021-09-17 PROCEDURE — 80061 LIPID PANEL: CPT

## 2021-09-17 PROCEDURE — 85025 COMPLETE CBC W/AUTO DIFF WBC: CPT

## 2021-09-22 ENCOUNTER — OFFICE VISIT (OUTPATIENT)
Dept: MEDICAL GROUP | Facility: MEDICAL CENTER | Age: 66
End: 2021-09-22
Payer: MEDICARE

## 2021-09-22 VITALS
WEIGHT: 130.18 LBS | TEMPERATURE: 97.8 F | BODY MASS INDEX: 21.69 KG/M2 | DIASTOLIC BLOOD PRESSURE: 72 MMHG | SYSTOLIC BLOOD PRESSURE: 130 MMHG | HEIGHT: 65 IN

## 2021-09-22 DIAGNOSIS — M25.511 CHRONIC RIGHT SHOULDER PAIN: ICD-10-CM

## 2021-09-22 DIAGNOSIS — E78.2 MIXED HYPERLIPIDEMIA: ICD-10-CM

## 2021-09-22 DIAGNOSIS — Z12.4 SCREENING FOR CERVICAL CANCER: ICD-10-CM

## 2021-09-22 DIAGNOSIS — Z23 NEED FOR VACCINATION: ICD-10-CM

## 2021-09-22 DIAGNOSIS — G89.29 CHRONIC RIGHT SHOULDER PAIN: ICD-10-CM

## 2021-09-22 DIAGNOSIS — I10 ESSENTIAL HYPERTENSION: ICD-10-CM

## 2021-09-22 PROCEDURE — 90662 IIV NO PRSV INCREASED AG IM: CPT | Performed by: FAMILY MEDICINE

## 2021-09-22 PROCEDURE — 99214 OFFICE O/P EST MOD 30 MIN: CPT | Mod: 25 | Performed by: FAMILY MEDICINE

## 2021-09-22 PROCEDURE — G0008 ADMIN INFLUENZA VIRUS VAC: HCPCS | Performed by: FAMILY MEDICINE

## 2021-09-22 RX ORDER — HYDROCODONE BITARTRATE AND ACETAMINOPHEN 5; 325 MG/1; MG/1
1 TABLET ORAL EVERY 8 HOURS PRN
Qty: 30 TABLET | Refills: 0 | Status: SHIPPED | OUTPATIENT
Start: 2021-09-22 | End: 2021-10-22

## 2021-09-22 ASSESSMENT — ENCOUNTER SYMPTOMS
VOMITING: 0
DIARRHEA: 0
COUGH: 0
SHORTNESS OF BREATH: 0
ABDOMINAL PAIN: 0
CONSTIPATION: 0
WHEEZING: 0
PALPITATIONS: 0
CHILLS: 0
FEVER: 0
BLOOD IN STOOL: 0

## 2021-09-22 ASSESSMENT — FIBROSIS 4 INDEX: FIB4 SCORE: 0.56

## 2021-09-22 NOTE — ASSESSMENT & PLAN NOTE
She is taking lisinopril 40 mg daily, hydrochlorothiazide 25 mg daily.  Upper pressure today came back at 130/72.  Denies any side effects with medication.    Lab Results   Component Value Date/Time    CREATININE 0.59 09/17/2021 11:14 AM    CREATININE 0.7 11/06/2006 04:00 PM

## 2021-09-22 NOTE — ASSESSMENT & PLAN NOTE
She is taking Lipitor 80 mg once daily.  Recent cholesterol panel came back elevated.  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    Lab Results   Component Value Date/Time    CHOLSTRLTOT 263 (H) 09/17/2021 1114    TRIGLYCERIDE 184 (H) 09/17/2021 1114     09/17/2021 1114     (H) 09/17/2021 1114

## 2021-09-22 NOTE — PROGRESS NOTES
FAMILY MEDICINE VISIT                                                               Chief complaint::Diagnoses of Mixed hyperlipidemia, Essential hypertension, Need for vaccination, Chronic right shoulder pain, and Screening for cervical cancer were pertinent to this visit.    History of present illness: Amina Wallace is a 65 y.o. female who presented to establish care, medication refill.    Chronic right shoulder pain  Chronic health problem.  She is currently on Norco 5 325 mg as needed for pain.  She reports that she had arthroscopy done before which showed severe arthritic changes but she would like to follow-up with orthopedic surgeon for further consultation regarding surgery on any other treatment.  She requested refill for Norco.    HTN (hypertension)  She is taking lisinopril 40 mg daily, hydrochlorothiazide 25 mg daily.  Upper pressure today came back at 130/72.  Denies any side effects with medication.    Lab Results   Component Value Date/Time    CREATININE 0.59 09/17/2021 11:14 AM    CREATININE 0.7 11/06/2006 04:00 PM        Hyperlipidemia  She is taking Lipitor 80 mg once daily.  Recent cholesterol panel came back elevated.  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    Lab Results   Component Value Date/Time    CHOLSTRLTOT 263 (H) 09/17/2021 1114    TRIGLYCERIDE 184 (H) 09/17/2021 1114     09/17/2021 1114     (H) 09/17/2021 1114         Review of systems:     Review of Systems   Constitutional: Negative for chills, fever and malaise/fatigue.   Respiratory: Negative for cough, shortness of breath and wheezing.    Cardiovascular: Negative for chest pain, palpitations and leg swelling.   Gastrointestinal: Negative for abdominal pain, blood in stool, constipation, diarrhea and vomiting.   Musculoskeletal:        Right shoulder pain   Skin: Negative for rash.        Past Medical, Surgical and Family History:    Past Medical History:   Diagnosis Date  "  • Hyperlipidemia 3/19/2012   • Hypertension      Past Surgical History:   Procedure Laterality Date   • KNEE ARTHROPLASTY TOTAL       Family History   Problem Relation Age of Onset   • Cancer Mother         cervical   • Parkinson's Disease Father    • Hypertension Father    • Hyperlipidemia Father    • No Known Problems Brother         Social History:    Social History     Tobacco Use   • Smoking status: Never Smoker   • Smokeless tobacco: Never Used   Vaping Use   • Vaping Use: Never used   Substance Use Topics   • Alcohol use: Yes   • Drug use: No        Medications and Allergies:     Current Outpatient Medications   Medication Sig Dispense Refill   • HYDROcodone-acetaminophen (NORCO) 5-325 MG Tab per tablet Take 1 Tablet by mouth every 8 hours as needed for up to 30 days. 30 Tablet 0   • lisinopril (PRINIVIL) 40 MG tablet Take 1 tablet by mouth every day. 100 tablet 3   • hydroCHLOROthiazide (HYDRODIURIL) 25 MG Tab Take 1 tablet by mouth every day. 90 tablet 3   • atorvastatin (LIPITOR) 80 MG tablet Take 1 tablet by mouth every evening. 100 tablet 3     No current facility-administered medications for this visit.        Allergies   Allergen Reactions   • Penicillins        Vitals:    /72   Temp 36.6 °C (97.8 °F) (Temporal)   Ht 1.651 m (5' 5\")   Wt 59.1 kg (130 lb 2.9 oz)  Body mass index is 21.66 kg/m².    Physical Exam:     Physical Exam  Constitutional:       General: She is not in acute distress.  HENT:      Head: Normocephalic and atraumatic.   Eyes:      Conjunctiva/sclera: Conjunctivae normal.   Cardiovascular:      Rate and Rhythm: Normal rate and regular rhythm.      Heart sounds: Normal heart sounds. No murmur heard.   No friction rub. No gallop.    Pulmonary:      Effort: Pulmonary effort is normal. No respiratory distress.      Breath sounds: Normal breath sounds. No wheezing or rales.   Musculoskeletal:         General: No deformity.      Cervical back: Neck supple.   Neurological:      " Mental Status: She is alert.      Gait: Gait is intact.   Psychiatric:         Mood and Affect: Mood and affect normal.         Judgment: Judgment normal.            Assessment/Plan:    1. Mixed hyperlipidemia  Chronic health problem, controlled, continue atorvastatin 80 mg once daily.  Recheck labs in 6 months.      - Comp Metabolic Panel; Future  - Lipid Profile; Future  - TSH WITH REFLEX TO FT4; Future    2. Essential hypertension  Chronic health problem, stable, continue same meds.    - Comp Metabolic Panel; Future    3. Need for vaccination  Flu vaccine given today.    - INFLUENZA VACCINE, HIGH DOSE (65+ ONLY)    4. Chronic right shoulder pain  Chronic health problem, uncontrolled pain if she does not take medication.  Discussed with patient about Norco prescription.  Check PDMP, medication sent for 30 days.  Referral to orthopedics for further evaluation.  Controlled substance treatment signed today.    Obtained and reviewed patient utilization report from [facility] pharmacy database on 9/22/2021 3:11 PM  prior to writing prescription for controlled substance II, III or IV per [place] bill . Based on assessment of the report,my physical exam if necessary and the patient's health problem, the prescription is medically necessary.     - REFERRAL TO ORTHOPEDICS  - Controlled Substance Treatment Agreement  - HYDROcodone-acetaminophen (NORCO) 5-325 MG Tab per tablet; Take 1 Tablet by mouth every 8 hours as needed for up to 30 days.  Dispense: 30 Tablet; Refill: 0    5. Screening for cervical cancer  She would like to follow-up with gynecology for Pap smear.  Referral to gynecology.    - REFERRAL TO GYNECOLOGY    Please note that this dictation was created using voice recognition software. I have made every reasonable attempt to correct obvious errors, but I expect that there are errors of grammar and possibly content that I did not discover before finalizing the note.    Follow up in 6 months for lab  follow-up.

## 2021-09-22 NOTE — ASSESSMENT & PLAN NOTE
Chronic health problem.  She is currently on Norco 5 325 mg as needed for pain.  She reports that she had arthroscopy done before which showed severe arthritic changes but she would like to follow-up with orthopedic surgeon for further consultation regarding surgery on any other treatment.  She requested refill for Neptune.

## 2021-10-25 ENCOUNTER — APPOINTMENT (OUTPATIENT)
Dept: MEDICAL GROUP | Facility: MEDICAL CENTER | Age: 66
End: 2021-10-25
Payer: MEDICARE

## 2021-11-01 ENCOUNTER — OFFICE VISIT (OUTPATIENT)
Dept: DERMATOLOGY | Facility: IMAGING CENTER | Age: 66
End: 2021-11-01
Payer: MEDICARE

## 2021-11-01 DIAGNOSIS — L30.9 DERMATITIS: ICD-10-CM

## 2021-11-01 DIAGNOSIS — L57.0 ACTINIC KERATOSES: ICD-10-CM

## 2021-11-01 DIAGNOSIS — L82.1 SEBORRHEIC KERATOSES: ICD-10-CM

## 2021-11-01 PROCEDURE — 99203 OFFICE O/P NEW LOW 30 MIN: CPT | Mod: 25 | Performed by: NURSE PRACTITIONER

## 2021-11-01 PROCEDURE — 17000 DESTRUCT PREMALG LESION: CPT | Performed by: NURSE PRACTITIONER

## 2021-11-01 RX ORDER — TRIAMCINOLONE ACETONIDE 1 MG/G
1 OINTMENT TOPICAL 2 TIMES DAILY
Qty: 60 G | Refills: 2 | Status: SHIPPED | OUTPATIENT
Start: 2021-11-01 | End: 2022-09-06

## 2021-11-01 NOTE — PROGRESS NOTES
DERMATOLOGY NOTE  NEW VISIT       Chief complaint:  Skin lesion      HPI: back-maybe rash per patient.  Very itchy   Onset:  1 year   Aggravating factors:  Unknown   Alleviating factors:  Epsom salt  bath   New creams/topicals:  no  New medications (up to last 6 months): no  New travel: no  Other exposures: no  Treatments: no         History of skin cancer: No  History of precancers/actinic keratoses: No  History of biopsies:No  History of blistering/severe sunburns:Yes, Details: .  Family history of skin cancer:No  Family history of atypical moles:No      Allergies   Allergen Reactions   • Penicillins         MEDICATIONS:  Medications relevant to specialty reviewed.     REVIEW OF SYSTEMS:   Positive for skin (see HPI)  Negative for fevers and chills       EXAM:  There were no vitals taken for this visit.  Constitutional: Well-developed, well-nourished, and in no distress.     A focused skin exam was performed including the affected areas of the head (including face), neck, chest, back and bilateral upper extremities. Notable findings on exam today listed below and/or in assessment/plan.     Flesh colored sks to back  One ak to left forehead  Dermatitis noted to left flank area    IMPRESSION / PLAN:    1. Actinic keratoses  CRYOTHERAPY:  Risks (including, but not limited to: hypo or hyperpigmentation, redness, blister, blood blister, recurrence, need for further treatment, infection, scar) and benefits of cryotherapy discussed. Patient verbally agreed to proceed with treatment. 2 cryotherapy freeze thaw cycles of 10 seconds were applied to 1 lesion on left forehead with cryac. Patient tolerated procedure well. Aftercare instructions given.      2. Seborrheic keratoses  - Benign-appearing nature of lesions discussed. Advised to return to clinic for any new or concerning changes.      3. Dermatitis  - We reviewed dry skin care in detail, including short showers or baths with luke warm water, limited use of  fragrance-free soap, generous use of bland fragrance-free emollients within 3 min of exiting the shower or bath, and fragrance free laundry products.  - Start Triamcinolone 0.1% ointment BID to affected areas of the body for 2-4 weeks  - We reviewed risks/benefits/expectations of treatment in detail, including side effects of long term topical steroid use (such as striae, atrophy and telangiectasias).            Please note that this dictation was created using voice recognition software. I have made every reasonable attempt to correct obvious errors, but I expect that there are errors of grammar and possibly content that I did not discover before finalizing the note.      Return to clinic in: Return for as needed. and as needed for any new or changing skin lesions.

## 2021-11-04 ENCOUNTER — NON-PROVIDER VISIT (OUTPATIENT)
Dept: MEDICAL GROUP | Facility: MEDICAL CENTER | Age: 66
End: 2021-11-04
Payer: MEDICARE

## 2021-11-04 DIAGNOSIS — Z23 NEED FOR SHINGLES VACCINE: ICD-10-CM

## 2021-11-04 PROCEDURE — 99999 PR NO CHARGE: CPT | Performed by: FAMILY MEDICINE

## 2021-11-04 PROCEDURE — 90750 HZV VACC RECOMBINANT IM: CPT | Performed by: FAMILY MEDICINE

## 2021-11-04 PROCEDURE — 90471 IMMUNIZATION ADMIN: CPT | Performed by: FAMILY MEDICINE

## 2021-11-04 NOTE — PROGRESS NOTES
"Amina Wallace is a 66 y.o. female here for a non-provider visit for:   SHINGRIX (Shingles)    Reason for immunization: continue or complete series started at the office  Immunization records indicate need for vaccine: Yes, confirmed with Epic  Minimum interval has been met for this vaccine: Yes  ABN completed: Yes    All IAC Questionnaire questions were answered \"No.\"    Patient tolerated injection and no adverse effects were observed or reported: Yes    Pt scheduled for next dose in series: Not Indicated  "

## 2022-01-10 DIAGNOSIS — E78.2 MIXED HYPERLIPIDEMIA: ICD-10-CM

## 2022-01-10 DIAGNOSIS — I10 ESSENTIAL HYPERTENSION: ICD-10-CM

## 2022-01-10 RX ORDER — LISINOPRIL 40 MG/1
TABLET ORAL
Qty: 90 TABLET | Refills: 3 | Status: SHIPPED | OUTPATIENT
Start: 2022-01-10 | End: 2022-08-10 | Stop reason: SDUPTHER

## 2022-01-10 RX ORDER — ATORVASTATIN CALCIUM 80 MG/1
TABLET, FILM COATED ORAL
Qty: 90 TABLET | Refills: 3 | Status: SHIPPED | OUTPATIENT
Start: 2022-01-10 | End: 2022-08-10 | Stop reason: SDUPTHER

## 2022-01-11 ENCOUNTER — TELEPHONE (OUTPATIENT)
Dept: MEDICAL GROUP | Facility: MEDICAL CENTER | Age: 67
End: 2022-01-11

## 2022-01-11 NOTE — TELEPHONE ENCOUNTER
Phone Number Called: [phone]    Call outcome: Left detailed message for patient. Informed to call back with any additional questions.    Message: I left a message explaining to Amina that her medications were filled with a 90 day quantity and 3 refills which will last her a year.

## 2022-01-11 NOTE — TELEPHONE ENCOUNTER
Caller Name: Amina  Call Back Number: [phone]    How would the patient prefer to be contacted with a response: Phone call OK to leave a detailed message    Patient called and said she usually gets a 1 year supply, not 3 month supply of the two medications that were filled yesterday.

## 2022-01-11 NOTE — TELEPHONE ENCOUNTER
I sent a year supply which is 90 tablets and 3 refills on  prescriptions.  Please let patient know about this.

## 2022-01-31 ENCOUNTER — PATIENT MESSAGE (OUTPATIENT)
Dept: HEALTH INFORMATION MANAGEMENT | Facility: OTHER | Age: 67
End: 2022-01-31
Payer: MEDICARE

## 2022-03-22 ENCOUNTER — APPOINTMENT (OUTPATIENT)
Dept: MEDICAL GROUP | Facility: MEDICAL CENTER | Age: 67
End: 2022-03-22
Payer: MEDICARE

## 2022-05-04 ENCOUNTER — TELEPHONE (OUTPATIENT)
Dept: HEALTH INFORMATION MANAGEMENT | Facility: OTHER | Age: 67
End: 2022-05-04

## 2022-05-17 ENCOUNTER — HOSPITAL ENCOUNTER (OUTPATIENT)
Dept: RADIOLOGY | Facility: MEDICAL CENTER | Age: 67
End: 2022-05-17
Attending: FAMILY MEDICINE
Payer: MEDICARE

## 2022-05-17 DIAGNOSIS — Z12.31 VISIT FOR SCREENING MAMMOGRAM: ICD-10-CM

## 2022-05-17 PROCEDURE — 77063 BREAST TOMOSYNTHESIS BI: CPT

## 2022-08-02 ENCOUNTER — HOSPITAL ENCOUNTER (OUTPATIENT)
Dept: LAB | Facility: MEDICAL CENTER | Age: 67
End: 2022-08-02
Attending: FAMILY MEDICINE
Payer: MEDICARE

## 2022-08-02 DIAGNOSIS — E78.2 MIXED HYPERLIPIDEMIA: ICD-10-CM

## 2022-08-02 DIAGNOSIS — I10 ESSENTIAL HYPERTENSION: ICD-10-CM

## 2022-08-02 LAB
ALBUMIN SERPL BCP-MCNC: 4.9 G/DL (ref 3.2–4.9)
ALBUMIN/GLOB SERPL: 1.6 G/DL
ALP SERPL-CCNC: 107 U/L (ref 30–99)
ALT SERPL-CCNC: 37 U/L (ref 2–50)
ANION GAP SERPL CALC-SCNC: 16 MMOL/L (ref 7–16)
AST SERPL-CCNC: 16 U/L (ref 12–45)
BILIRUB SERPL-MCNC: 0.7 MG/DL (ref 0.1–1.5)
BUN SERPL-MCNC: 18 MG/DL (ref 8–22)
CALCIUM SERPL-MCNC: 10 MG/DL (ref 8.5–10.5)
CHLORIDE SERPL-SCNC: 99 MMOL/L (ref 96–112)
CHOLEST SERPL-MCNC: 261 MG/DL (ref 100–199)
CO2 SERPL-SCNC: 20 MMOL/L (ref 20–33)
CREAT SERPL-MCNC: 0.68 MG/DL (ref 0.5–1.4)
FASTING STATUS PATIENT QL REPORTED: NORMAL
GFR SERPLBLD CREATININE-BSD FMLA CKD-EPI: 95 ML/MIN/1.73 M 2
GLOBULIN SER CALC-MCNC: 3.1 G/DL (ref 1.9–3.5)
GLUCOSE SERPL-MCNC: 98 MG/DL (ref 65–99)
HDLC SERPL-MCNC: 75 MG/DL
LDLC SERPL CALC-MCNC: 139 MG/DL
POTASSIUM SERPL-SCNC: 3.8 MMOL/L (ref 3.6–5.5)
PROT SERPL-MCNC: 8 G/DL (ref 6–8.2)
SODIUM SERPL-SCNC: 135 MMOL/L (ref 135–145)
TRIGL SERPL-MCNC: 235 MG/DL (ref 0–149)
TSH SERPL DL<=0.005 MIU/L-ACNC: 0.86 UIU/ML (ref 0.38–5.33)

## 2022-08-02 PROCEDURE — 84443 ASSAY THYROID STIM HORMONE: CPT

## 2022-08-02 PROCEDURE — 80053 COMPREHEN METABOLIC PANEL: CPT

## 2022-08-02 PROCEDURE — 80061 LIPID PANEL: CPT

## 2022-08-02 PROCEDURE — 36415 COLL VENOUS BLD VENIPUNCTURE: CPT

## 2022-08-10 ENCOUNTER — TELEPHONE (OUTPATIENT)
Dept: MEDICAL GROUP | Facility: MEDICAL CENTER | Age: 67
End: 2022-08-10

## 2022-08-10 ENCOUNTER — OFFICE VISIT (OUTPATIENT)
Dept: MEDICAL GROUP | Facility: MEDICAL CENTER | Age: 67
End: 2022-08-10
Payer: MEDICARE

## 2022-08-10 VITALS
WEIGHT: 130.29 LBS | DIASTOLIC BLOOD PRESSURE: 70 MMHG | HEART RATE: 85 BPM | BODY MASS INDEX: 21.71 KG/M2 | HEIGHT: 65 IN | OXYGEN SATURATION: 96 % | TEMPERATURE: 97.8 F | SYSTOLIC BLOOD PRESSURE: 130 MMHG

## 2022-08-10 DIAGNOSIS — Z12.12 SCREENING FOR COLORECTAL CANCER: ICD-10-CM

## 2022-08-10 DIAGNOSIS — Z12.4 SCREENING FOR CERVICAL CANCER: ICD-10-CM

## 2022-08-10 DIAGNOSIS — N39.498 OTHER URINARY INCONTINENCE: ICD-10-CM

## 2022-08-10 DIAGNOSIS — M25.511 CHRONIC RIGHT SHOULDER PAIN: ICD-10-CM

## 2022-08-10 DIAGNOSIS — Z12.11 SCREENING FOR COLORECTAL CANCER: ICD-10-CM

## 2022-08-10 DIAGNOSIS — G89.29 CHRONIC RIGHT SHOULDER PAIN: ICD-10-CM

## 2022-08-10 DIAGNOSIS — E78.2 MIXED HYPERLIPIDEMIA: ICD-10-CM

## 2022-08-10 DIAGNOSIS — Z23 NEED FOR VACCINATION: ICD-10-CM

## 2022-08-10 DIAGNOSIS — I10 PRIMARY HYPERTENSION: ICD-10-CM

## 2022-08-10 PROBLEM — R32 ABSENCE OF BLADDER CONTINENCE: Status: ACTIVE | Noted: 2022-08-10

## 2022-08-10 PROCEDURE — G0009 ADMIN PNEUMOCOCCAL VACCINE: HCPCS | Performed by: FAMILY MEDICINE

## 2022-08-10 PROCEDURE — 99214 OFFICE O/P EST MOD 30 MIN: CPT | Mod: 25 | Performed by: FAMILY MEDICINE

## 2022-08-10 PROCEDURE — 90677 PCV20 VACCINE IM: CPT | Performed by: FAMILY MEDICINE

## 2022-08-10 RX ORDER — LISINOPRIL 40 MG/1
40 TABLET ORAL DAILY
Qty: 90 TABLET | Refills: 3 | Status: SHIPPED | OUTPATIENT
Start: 2022-08-10 | End: 2023-09-27 | Stop reason: SDUPTHER

## 2022-08-10 RX ORDER — EZETIMIBE 10 MG/1
10 TABLET ORAL DAILY
Qty: 90 TABLET | Refills: 3 | Status: SHIPPED | OUTPATIENT
Start: 2022-08-10 | End: 2023-09-27 | Stop reason: SDUPTHER

## 2022-08-10 RX ORDER — ATORVASTATIN CALCIUM 80 MG/1
80 TABLET, FILM COATED ORAL EVERY EVENING
Qty: 90 TABLET | Refills: 3 | Status: SHIPPED | OUTPATIENT
Start: 2022-08-10 | End: 2023-09-27 | Stop reason: SDUPTHER

## 2022-08-10 RX ORDER — HYDROCHLOROTHIAZIDE 25 MG/1
25 TABLET ORAL DAILY
Qty: 90 TABLET | Refills: 3 | Status: SHIPPED | OUTPATIENT
Start: 2022-08-10 | End: 2023-09-27 | Stop reason: SDUPTHER

## 2022-08-10 RX ORDER — HYDROCODONE BITARTRATE AND ACETAMINOPHEN 5; 325 MG/1; MG/1
1 TABLET ORAL EVERY 8 HOURS PRN
Qty: 30 TABLET | Refills: 0 | Status: SHIPPED | OUTPATIENT
Start: 2022-08-10 | End: 2022-08-10 | Stop reason: SDUPTHER

## 2022-08-10 RX ORDER — HYDROCODONE BITARTRATE AND ACETAMINOPHEN 5; 325 MG/1; MG/1
1 TABLET ORAL EVERY 8 HOURS PRN
Qty: 30 TABLET | Refills: 0 | Status: SHIPPED | OUTPATIENT
Start: 2022-08-10 | End: 2022-09-09

## 2022-08-10 ASSESSMENT — ENCOUNTER SYMPTOMS
FEVER: 0
CHILLS: 0
PALPITATIONS: 0

## 2022-08-10 ASSESSMENT — PATIENT HEALTH QUESTIONNAIRE - PHQ9: CLINICAL INTERPRETATION OF PHQ2 SCORE: 0

## 2022-08-10 ASSESSMENT — FIBROSIS 4 INDEX: FIB4 SCORE: 0.47

## 2022-08-10 NOTE — ASSESSMENT & PLAN NOTE
Chronic health problem, uncontrolled, counseled regarding modifying diet.  Add Zetia 10 mg daily.  Recheck labs in 6-month

## 2022-08-10 NOTE — PROGRESS NOTES
FAMILY MEDICINE VISIT                                                               Chief complaint::Diagnoses of Mixed hyperlipidemia, Primary hypertension, Chronic right shoulder pain, Other urinary incontinence, Screening for colorectal cancer, Screening for cervical cancer, and Need for vaccination were pertinent to this visit.    History of present illness: Amina Wallace is a 66 y.o. female who presented for follow-up for labs, med refills.    Problem   Absence of Bladder Continence    She has mild urinary incontinence, would like to see gynecology.  She has not done Pap smear for many years and reports a lot of dryness and vaginal areas.  Would like to see gynecology     Chronic Right Shoulder Pain    She is currently on Norco 5-325 milligrams as needed for pain.  Her last refill was last year.  She uses this medication once or twice a month.  She is following with orthopedics and is going to have surgery for shoulder     Htn (Hypertension)    Blood pressure today 130/70.  She is on hydrochlorothiazide 25 mg daily and lisinopril 40 mg daily.  Doing well with this medication.  Recent kidney function, electrolytes came back in normal range.     Hyperlipidemia    She is currently on Lipitor 80 mg daily.  She reports that she takes this medication consistently.  Recent cholesterol panel came back elevated.    Lab Results   Component Value Date/Time    CHOLSTRLTOT 261 (H) 08/02/2022 1104    TRIGLYCERIDE 235 (H) 08/02/2022 1104    HDL 75 08/02/2022 1104     (H) 08/02/2022 1104               Review of systems:     Review of Systems   Constitutional:  Negative for chills, fever and malaise/fatigue.   Cardiovascular:  Negative for chest pain, palpitations and leg swelling.   Musculoskeletal:  Positive for joint pain.        Right shoulder pain      Medications and Allergies:     Current Outpatient Medications   Medication Sig Dispense Refill    ezetimibe (ZETIA) 10 MG Tab Take 1 Tablet by mouth every day. 90  "Tablet 3    lisinopril (PRINIVIL) 40 MG tablet Take 1 Tablet by mouth every day. 90 Tablet 3    atorvastatin (LIPITOR) 80 MG tablet Take 1 Tablet by mouth every evening. 90 Tablet 3    hydroCHLOROthiazide (HYDRODIURIL) 25 MG Tab Take 1 Tablet by mouth every day. 90 Tablet 3    HYDROcodone-acetaminophen (NORCO) 5-325 MG Tab per tablet Take 1 Tablet by mouth every 8 hours as needed (Moderate to severe pain) for up to 30 days. 30 Tablet 0    triamcinolone acetonide (KENALOG) 0.1 % Ointment Apply 1 Application topically 2 times a day. To back for 2-4 weeks. 60 g 2     No current facility-administered medications for this visit.          Vitals:    /70 (BP Location: Left arm, Patient Position: Sitting, BP Cuff Size: Adult)   Pulse 85   Temp 36.6 °C (97.8 °F) (Temporal)   Ht 1.64 m (5' 4.57\")   Wt 59.1 kg (130 lb 4.7 oz)   SpO2 96%  Body mass index is 21.97 kg/m².    Physical Exam:     Physical Exam  Constitutional:       General: She is not in acute distress.  HENT:      Head: Normocephalic and atraumatic.   Eyes:      Conjunctiva/sclera: Conjunctivae normal.   Cardiovascular:      Rate and Rhythm: Normal rate and regular rhythm.      Heart sounds: Normal heart sounds. No murmur heard.    No friction rub. No gallop.   Pulmonary:      Effort: Pulmonary effort is normal. No respiratory distress.      Breath sounds: Normal breath sounds. No wheezing or rales.   Musculoskeletal:         General: No deformity.      Cervical back: Neck supple.   Neurological:      Mental Status: She is alert.      Gait: Gait is intact.   Psychiatric:         Mood and Affect: Mood and affect normal.         Judgment: Judgment normal.        Labs:  I reviewed with patient recent labs resulted on 8/2/2022.    Assessment/Plan:    Problem List Items Addressed This Visit       Absence of bladder continence     Chronic ongoing problem, uncontrolled, referral to gynecology for further evaluation.         Relevant Orders    Referral to " Gynecology    Chronic right shoulder pain     Chronic health problem, uncontrolled pain now, limited range of motion, going to follow-up with orthopedics for surgery.  Checked PDMP.  Medication sent for 30 days.    Obtained and reviewed patient utilization report from [facility] pharmacy database on 8/10/2022 1:40 PM  prior to writing prescription for controlled substance II, III or IV per [place] bill . Based on assessment of the report,my physical exam if necessary and the patient's health problem, the prescription is medically necessary.          Relevant Medications    HYDROcodone-acetaminophen (NORCO) 5-325 MG Tab per tablet    HTN (hypertension)     Chronic health problem, stable, continue same medication regimen.         Relevant Medications    ezetimibe (ZETIA) 10 MG Tab    lisinopril (PRINIVIL) 40 MG tablet    atorvastatin (LIPITOR) 80 MG tablet    hydroCHLOROthiazide (HYDRODIURIL) 25 MG Tab    Hyperlipidemia     Chronic health problem, uncontrolled, counseled regarding modifying diet.  Add Zetia 10 mg daily.  Recheck labs in 6-month         Relevant Medications    ezetimibe (ZETIA) 10 MG Tab    lisinopril (PRINIVIL) 40 MG tablet    atorvastatin (LIPITOR) 80 MG tablet    hydroCHLOROthiazide (HYDRODIURIL) 25 MG Tab    Other Relevant Orders    Comp Metabolic Panel    Lipid Profile     Other Visit Diagnoses       Screening for colorectal cancer        Relevant Orders    Referral to GI for Colonoscopy    Screening for cervical cancer        Relevant Orders    Referral to Gynecology    Need for vaccination        Relevant Orders    Pneumococcal Conjugate Vaccine 20-Valent (19 yrs+) (Completed)             Please note that this dictation was created using voice recognition software. I have made every reasonable attempt to correct obvious errors, but I expect that there are errors of grammar and possibly content that I did not discover before finalizing the note.    Follow up in 6 months for lab  follow-up

## 2022-08-10 NOTE — TELEPHONE ENCOUNTER
I called the [facility] pharmacy and cancelled the Norco RX. We are going to send it to another pharmacy.

## 2022-08-10 NOTE — ASSESSMENT & PLAN NOTE
Chronic health problem, uncontrolled pain now, limited range of motion, going to follow-up with orthopedics for surgery.  Checked [place]MP.  Medication sent for 30 days.    Obtained and reviewed patient utilization report from [facility] pharmacy database on 8/10/2022 1:40 PM  prior to writing prescription for controlled substance II, III or IV per [place] bill . Based on assessment of the report,my physical exam if necessary and the patient's health problem, the prescription is medically necessary.

## 2022-09-06 PROBLEM — M25.512 LEFT SHOULDER PAIN: Status: ACTIVE | Noted: 2022-09-06

## 2022-09-06 ASSESSMENT — FIBROSIS 4 INDEX: FIB4 SCORE: 0.47

## 2022-10-06 ENCOUNTER — PRE-ADMISSION TESTING (OUTPATIENT)
Dept: ADMISSIONS | Facility: MEDICAL CENTER | Age: 67
End: 2022-10-06
Attending: SURGERY
Payer: MEDICARE

## 2022-10-06 DIAGNOSIS — Z01.812 PRE-OPERATIVE LABORATORY EXAMINATION: ICD-10-CM

## 2022-10-06 LAB
ANION GAP SERPL CALC-SCNC: 12 MMOL/L (ref 7–16)
BASOPHILS # BLD AUTO: 0.9 % (ref 0–1.8)
BASOPHILS # BLD: 0.09 K/UL (ref 0–0.12)
BUN SERPL-MCNC: 21 MG/DL (ref 8–22)
CALCIUM SERPL-MCNC: 9.9 MG/DL (ref 8.5–10.5)
CHLORIDE SERPL-SCNC: 100 MMOL/L (ref 96–112)
CO2 SERPL-SCNC: 24 MMOL/L (ref 20–33)
CREAT SERPL-MCNC: 0.45 MG/DL (ref 0.5–1.4)
EOSINOPHIL # BLD AUTO: 0.22 K/UL (ref 0–0.51)
EOSINOPHIL NFR BLD: 2.2 % (ref 0–6.9)
ERYTHROCYTE [DISTWIDTH] IN BLOOD BY AUTOMATED COUNT: 43.9 FL (ref 35.9–50)
GFR SERPLBLD CREATININE-BSD FMLA CKD-EPI: 105 ML/MIN/1.73 M 2
GLUCOSE SERPL-MCNC: 104 MG/DL (ref 65–99)
HCT VFR BLD AUTO: 41.4 % (ref 37–47)
HGB BLD-MCNC: 14.1 G/DL (ref 12–16)
IMM GRANULOCYTES # BLD AUTO: 0.03 K/UL (ref 0–0.11)
IMM GRANULOCYTES NFR BLD AUTO: 0.3 % (ref 0–0.9)
LYMPHOCYTES # BLD AUTO: 2.68 K/UL (ref 1–4.8)
LYMPHOCYTES NFR BLD: 26.7 % (ref 22–41)
MCH RBC QN AUTO: 32.6 PG (ref 27–33)
MCHC RBC AUTO-ENTMCNC: 34.1 G/DL (ref 33.6–35)
MCV RBC AUTO: 95.6 FL (ref 81.4–97.8)
MONOCYTES # BLD AUTO: 0.73 K/UL (ref 0–0.85)
MONOCYTES NFR BLD AUTO: 7.3 % (ref 0–13.4)
NEUTROPHILS # BLD AUTO: 6.28 K/UL (ref 2–7.15)
NEUTROPHILS NFR BLD: 62.6 % (ref 44–72)
NRBC # BLD AUTO: 0 K/UL
NRBC BLD-RTO: 0 /100 WBC
PLATELET # BLD AUTO: 419 K/UL (ref 164–446)
PMV BLD AUTO: 9 FL (ref 9–12.9)
POTASSIUM SERPL-SCNC: 4.4 MMOL/L (ref 3.6–5.5)
RBC # BLD AUTO: 4.33 M/UL (ref 4.2–5.4)
SODIUM SERPL-SCNC: 136 MMOL/L (ref 135–145)
WBC # BLD AUTO: 10 K/UL (ref 4.8–10.8)

## 2022-10-06 PROCEDURE — 80048 BASIC METABOLIC PNL TOTAL CA: CPT

## 2022-10-06 PROCEDURE — 36415 COLL VENOUS BLD VENIPUNCTURE: CPT

## 2022-10-06 PROCEDURE — 85025 COMPLETE CBC W/AUTO DIFF WBC: CPT

## 2022-10-06 ASSESSMENT — FIBROSIS 4 INDEX: FIB4 SCORE: 0.48

## 2022-10-19 ENCOUNTER — ANESTHESIA (OUTPATIENT)
Dept: SURGERY | Facility: MEDICAL CENTER | Age: 67
End: 2022-10-19
Payer: MEDICARE

## 2022-10-19 ENCOUNTER — HOSPITAL ENCOUNTER (OUTPATIENT)
Facility: MEDICAL CENTER | Age: 67
End: 2022-10-19
Attending: SURGERY | Admitting: SURGERY
Payer: MEDICARE

## 2022-10-19 ENCOUNTER — ANESTHESIA EVENT (OUTPATIENT)
Dept: SURGERY | Facility: MEDICAL CENTER | Age: 67
End: 2022-10-19
Payer: MEDICARE

## 2022-10-19 ENCOUNTER — PHARMACY VISIT (OUTPATIENT)
Dept: PHARMACY | Facility: MEDICAL CENTER | Age: 67
End: 2022-10-19
Payer: COMMERCIAL

## 2022-10-19 ENCOUNTER — APPOINTMENT (OUTPATIENT)
Dept: RADIOLOGY | Facility: MEDICAL CENTER | Age: 67
End: 2022-10-19
Attending: SURGERY
Payer: MEDICARE

## 2022-10-19 VITALS
WEIGHT: 127.87 LBS | SYSTOLIC BLOOD PRESSURE: 94 MMHG | DIASTOLIC BLOOD PRESSURE: 65 MMHG | TEMPERATURE: 97 F | BODY MASS INDEX: 21.83 KG/M2 | OXYGEN SATURATION: 94 % | HEART RATE: 79 BPM | RESPIRATION RATE: 16 BRPM | HEIGHT: 64 IN

## 2022-10-19 DIAGNOSIS — G89.29 CHRONIC LEFT SHOULDER PAIN: ICD-10-CM

## 2022-10-19 DIAGNOSIS — G89.29 CHRONIC RIGHT SHOULDER PAIN: ICD-10-CM

## 2022-10-19 DIAGNOSIS — M25.511 CHRONIC RIGHT SHOULDER PAIN: ICD-10-CM

## 2022-10-19 DIAGNOSIS — M25.512 CHRONIC LEFT SHOULDER PAIN: ICD-10-CM

## 2022-10-19 PROCEDURE — 160029 HCHG SURGERY MINUTES - 1ST 30 MINS LEVEL 4: Performed by: SURGERY

## 2022-10-19 PROCEDURE — C1776 JOINT DEVICE (IMPLANTABLE): HCPCS | Performed by: SURGERY

## 2022-10-19 PROCEDURE — 23472 RECONSTRUCT SHOULDER JOINT: CPT | Mod: LT | Performed by: SURGERY

## 2022-10-19 PROCEDURE — 700105 HCHG RX REV CODE 258: Performed by: SURGERY

## 2022-10-19 PROCEDURE — 700101 HCHG RX REV CODE 250: Performed by: INTERNAL MEDICINE

## 2022-10-19 PROCEDURE — 160035 HCHG PACU - 1ST 60 MINS PHASE I: Performed by: SURGERY

## 2022-10-19 PROCEDURE — 73020 X-RAY EXAM OF SHOULDER: CPT | Mod: LT

## 2022-10-19 PROCEDURE — 160002 HCHG RECOVERY MINUTES (STAT): Performed by: SURGERY

## 2022-10-19 PROCEDURE — 502000 HCHG MISC OR IMPLANTS RC 0278: Performed by: SURGERY

## 2022-10-19 PROCEDURE — 700111 HCHG RX REV CODE 636 W/ 250 OVERRIDE (IP): Performed by: INTERNAL MEDICINE

## 2022-10-19 PROCEDURE — 160048 HCHG OR STATISTICAL LEVEL 1-5: Performed by: SURGERY

## 2022-10-19 PROCEDURE — 160046 HCHG PACU - 1ST 60 MINS PHASE II: Performed by: SURGERY

## 2022-10-19 PROCEDURE — 160041 HCHG SURGERY MINUTES - EA ADDL 1 MIN LEVEL 4: Performed by: SURGERY

## 2022-10-19 PROCEDURE — 502240 HCHG MISC OR SUPPLY RC 0272: Performed by: SURGERY

## 2022-10-19 PROCEDURE — 700102 HCHG RX REV CODE 250 W/ 637 OVERRIDE(OP): Performed by: INTERNAL MEDICINE

## 2022-10-19 PROCEDURE — 64415 NJX AA&/STRD BRCH PLXS IMG: CPT | Mod: 59,LT | Performed by: INTERNAL MEDICINE

## 2022-10-19 PROCEDURE — C1713 ANCHOR/SCREW BN/BN,TIS/BN: HCPCS | Performed by: SURGERY

## 2022-10-19 PROCEDURE — 23430 REPAIR BICEPS TENDON: CPT | Mod: 59 | Performed by: SURGERY

## 2022-10-19 PROCEDURE — A9270 NON-COVERED ITEM OR SERVICE: HCPCS | Performed by: INTERNAL MEDICINE

## 2022-10-19 PROCEDURE — 01638 ANES OPN/ARTHR TOT SHO RPLCM: CPT | Performed by: INTERNAL MEDICINE

## 2022-10-19 PROCEDURE — 700111 HCHG RX REV CODE 636 W/ 250 OVERRIDE (IP): Performed by: SURGERY

## 2022-10-19 PROCEDURE — 160025 RECOVERY II MINUTES (STATS): Performed by: SURGERY

## 2022-10-19 PROCEDURE — 76942 ECHO GUIDE FOR BIOPSY: CPT | Mod: 26 | Performed by: INTERNAL MEDICINE

## 2022-10-19 PROCEDURE — 160009 HCHG ANES TIME/MIN: Performed by: SURGERY

## 2022-10-19 PROCEDURE — RXMED WILLOW AMBULATORY MEDICATION CHARGE: Performed by: SURGERY

## 2022-10-19 PROCEDURE — 64415 NJX AA&/STRD BRCH PLXS IMG: CPT | Performed by: SURGERY

## 2022-10-19 DEVICE — BONE CEMENT SIMPLEX ANTIBIO - (10/PK): Type: IMPLANTABLE DEVICE | Site: SHOULDER | Status: FUNCTIONAL

## 2022-10-19 DEVICE — IMPLANTABLE DEVICE: Type: IMPLANTABLE DEVICE | Site: SHOULDER | Status: FUNCTIONAL

## 2022-10-19 RX ORDER — OXYCODONE HCL 5 MG/5 ML
10 SOLUTION, ORAL ORAL
Status: DISCONTINUED | OUTPATIENT
Start: 2022-10-19 | End: 2022-10-19 | Stop reason: HOSPADM

## 2022-10-19 RX ORDER — ONDANSETRON 2 MG/ML
INJECTION INTRAMUSCULAR; INTRAVENOUS PRN
Status: DISCONTINUED | OUTPATIENT
Start: 2022-10-19 | End: 2022-10-19 | Stop reason: SURG

## 2022-10-19 RX ORDER — CLINDAMYCIN PHOSPHATE 900 MG/50ML
INJECTION, SOLUTION INTRAVENOUS
Status: DISCONTINUED
Start: 2022-10-19 | End: 2022-10-19 | Stop reason: HOSPADM

## 2022-10-19 RX ORDER — HALOPERIDOL 5 MG/ML
1 INJECTION INTRAMUSCULAR
Status: DISCONTINUED | OUTPATIENT
Start: 2022-10-19 | End: 2022-10-19 | Stop reason: HOSPADM

## 2022-10-19 RX ORDER — SODIUM CHLORIDE, SODIUM LACTATE, POTASSIUM CHLORIDE, CALCIUM CHLORIDE 600; 310; 30; 20 MG/100ML; MG/100ML; MG/100ML; MG/100ML
INJECTION, SOLUTION INTRAVENOUS CONTINUOUS
Status: ACTIVE | OUTPATIENT
Start: 2022-10-19 | End: 2022-10-19

## 2022-10-19 RX ORDER — OXYCODONE HCL 5 MG/5 ML
5 SOLUTION, ORAL ORAL
Status: DISCONTINUED | OUTPATIENT
Start: 2022-10-19 | End: 2022-10-19 | Stop reason: HOSPADM

## 2022-10-19 RX ORDER — DIPHENHYDRAMINE HYDROCHLORIDE 50 MG/ML
12.5 INJECTION INTRAMUSCULAR; INTRAVENOUS
Status: DISCONTINUED | OUTPATIENT
Start: 2022-10-19 | End: 2022-10-19 | Stop reason: HOSPADM

## 2022-10-19 RX ORDER — HYDRALAZINE HYDROCHLORIDE 20 MG/ML
5 INJECTION INTRAMUSCULAR; INTRAVENOUS
Status: DISCONTINUED | OUTPATIENT
Start: 2022-10-19 | End: 2022-10-19 | Stop reason: HOSPADM

## 2022-10-19 RX ORDER — MEPERIDINE HYDROCHLORIDE 25 MG/ML
12.5 INJECTION INTRAMUSCULAR; INTRAVENOUS; SUBCUTANEOUS
Status: DISCONTINUED | OUTPATIENT
Start: 2022-10-19 | End: 2022-10-19 | Stop reason: HOSPADM

## 2022-10-19 RX ORDER — TRANEXAMIC ACID 100 MG/ML
INJECTION, SOLUTION INTRAVENOUS
Status: COMPLETED
Start: 2022-10-19 | End: 2022-10-19

## 2022-10-19 RX ORDER — KETOROLAC TROMETHAMINE 30 MG/ML
INJECTION, SOLUTION INTRAMUSCULAR; INTRAVENOUS PRN
Status: DISCONTINUED | OUTPATIENT
Start: 2022-10-19 | End: 2022-10-19 | Stop reason: SURG

## 2022-10-19 RX ORDER — ROCURONIUM BROMIDE 10 MG/ML
INJECTION, SOLUTION INTRAVENOUS PRN
Status: DISCONTINUED | OUTPATIENT
Start: 2022-10-19 | End: 2022-10-19 | Stop reason: SURG

## 2022-10-19 RX ORDER — HYDROMORPHONE HYDROCHLORIDE 1 MG/ML
0.1 INJECTION, SOLUTION INTRAMUSCULAR; INTRAVENOUS; SUBCUTANEOUS
Status: DISCONTINUED | OUTPATIENT
Start: 2022-10-19 | End: 2022-10-19 | Stop reason: HOSPADM

## 2022-10-19 RX ORDER — ROPIVACAINE HYDROCHLORIDE 5 MG/ML
INJECTION, SOLUTION EPIDURAL; INFILTRATION; PERINEURAL
Status: COMPLETED | OUTPATIENT
Start: 2022-10-19 | End: 2022-10-19

## 2022-10-19 RX ORDER — TRANEXAMIC ACID 100 MG/ML
INJECTION, SOLUTION INTRAVENOUS PRN
Status: DISCONTINUED | OUTPATIENT
Start: 2022-10-19 | End: 2022-10-19 | Stop reason: SURG

## 2022-10-19 RX ORDER — CEFAZOLIN SODIUM 1 G/3ML
2 INJECTION, POWDER, FOR SOLUTION INTRAMUSCULAR; INTRAVENOUS ONCE
Status: COMPLETED | OUTPATIENT
Start: 2022-10-19 | End: 2022-10-19

## 2022-10-19 RX ORDER — CELECOXIB 200 MG/1
200 CAPSULE ORAL ONCE
Status: COMPLETED | OUTPATIENT
Start: 2022-10-19 | End: 2022-10-19

## 2022-10-19 RX ORDER — DEXAMETHASONE SODIUM PHOSPHATE 4 MG/ML
INJECTION, SOLUTION INTRA-ARTICULAR; INTRALESIONAL; INTRAMUSCULAR; INTRAVENOUS; SOFT TISSUE PRN
Status: DISCONTINUED | OUTPATIENT
Start: 2022-10-19 | End: 2022-10-19 | Stop reason: SURG

## 2022-10-19 RX ORDER — LABETALOL HYDROCHLORIDE 5 MG/ML
5 INJECTION, SOLUTION INTRAVENOUS
Status: DISCONTINUED | OUTPATIENT
Start: 2022-10-19 | End: 2022-10-19 | Stop reason: HOSPADM

## 2022-10-19 RX ORDER — ONDANSETRON 2 MG/ML
4 INJECTION INTRAMUSCULAR; INTRAVENOUS
Status: DISCONTINUED | OUTPATIENT
Start: 2022-10-19 | End: 2022-10-19 | Stop reason: HOSPADM

## 2022-10-19 RX ORDER — HYDROMORPHONE HYDROCHLORIDE 1 MG/ML
0.4 INJECTION, SOLUTION INTRAMUSCULAR; INTRAVENOUS; SUBCUTANEOUS
Status: DISCONTINUED | OUTPATIENT
Start: 2022-10-19 | End: 2022-10-19 | Stop reason: HOSPADM

## 2022-10-19 RX ORDER — ACETAMINOPHEN 500 MG
1000 TABLET ORAL ONCE
Status: COMPLETED | OUTPATIENT
Start: 2022-10-19 | End: 2022-10-19

## 2022-10-19 RX ORDER — OXYCODONE HYDROCHLORIDE 5 MG/1
5 TABLET ORAL EVERY 4 HOURS PRN
Qty: 30 TABLET | Refills: 0 | Status: SHIPPED | OUTPATIENT
Start: 2022-10-19 | End: 2022-10-26

## 2022-10-19 RX ORDER — HYDROMORPHONE HYDROCHLORIDE 1 MG/ML
0.2 INJECTION, SOLUTION INTRAMUSCULAR; INTRAVENOUS; SUBCUTANEOUS
Status: DISCONTINUED | OUTPATIENT
Start: 2022-10-19 | End: 2022-10-19 | Stop reason: HOSPADM

## 2022-10-19 RX ADMIN — SUGAMMADEX 200 MG: 100 INJECTION, SOLUTION INTRAVENOUS at 08:48

## 2022-10-19 RX ADMIN — PROPOFOL 120 MG: 10 INJECTION, EMULSION INTRAVENOUS at 07:33

## 2022-10-19 RX ADMIN — TRANEXAMIC ACID 1000 MG: 100 INJECTION, SOLUTION INTRAVENOUS at 08:30

## 2022-10-19 RX ADMIN — SODIUM CHLORIDE, POTASSIUM CHLORIDE, SODIUM LACTATE AND CALCIUM CHLORIDE: 600; 310; 30; 20 INJECTION, SOLUTION INTRAVENOUS at 07:26

## 2022-10-19 RX ADMIN — FENTANYL CITRATE 50 MCG: 50 INJECTION, SOLUTION INTRAMUSCULAR; INTRAVENOUS at 07:33

## 2022-10-19 RX ADMIN — EPHEDRINE SULFATE 5 MG: 50 INJECTION, SOLUTION INTRAVENOUS at 07:45

## 2022-10-19 RX ADMIN — ROPIVACAINE HYDROCHLORIDE 25 ML: 5 INJECTION, SOLUTION EPIDURAL; INFILTRATION; PERINEURAL at 06:55

## 2022-10-19 RX ADMIN — ONDANSETRON 4 MG: 2 INJECTION INTRAMUSCULAR; INTRAVENOUS at 08:33

## 2022-10-19 RX ADMIN — FENTANYL CITRATE 50 MCG: 50 INJECTION, SOLUTION INTRAMUSCULAR; INTRAVENOUS at 06:55

## 2022-10-19 RX ADMIN — EPHEDRINE SULFATE 5 MG: 50 INJECTION, SOLUTION INTRAVENOUS at 08:02

## 2022-10-19 RX ADMIN — CLINDAMYCIN PHOSPHATE 900 MG: 150 INJECTION, SOLUTION INTRAMUSCULAR; INTRAVENOUS at 07:37

## 2022-10-19 RX ADMIN — EPHEDRINE SULFATE 5 MG: 50 INJECTION, SOLUTION INTRAVENOUS at 08:06

## 2022-10-19 RX ADMIN — KETOROLAC TROMETHAMINE 15 MG: 30 INJECTION, SOLUTION INTRAMUSCULAR at 08:33

## 2022-10-19 RX ADMIN — ROCURONIUM BROMIDE 50 MG: 10 INJECTION, SOLUTION INTRAVENOUS at 07:33

## 2022-10-19 RX ADMIN — CELECOXIB 200 MG: 200 CAPSULE ORAL at 06:39

## 2022-10-19 RX ADMIN — TRANEXAMIC ACID 1000 MG: 100 INJECTION, SOLUTION INTRAVENOUS at 07:37

## 2022-10-19 RX ADMIN — EPHEDRINE SULFATE 5 MG: 50 INJECTION, SOLUTION INTRAVENOUS at 07:39

## 2022-10-19 RX ADMIN — EPHEDRINE SULFATE 5 MG: 50 INJECTION, SOLUTION INTRAVENOUS at 07:48

## 2022-10-19 RX ADMIN — EPHEDRINE SULFATE 5 MG: 50 INJECTION, SOLUTION INTRAVENOUS at 07:55

## 2022-10-19 RX ADMIN — CEFAZOLIN 2 G: 330 INJECTION, POWDER, FOR SOLUTION INTRAMUSCULAR; INTRAVENOUS at 07:37

## 2022-10-19 RX ADMIN — DEXAMETHASONE SODIUM PHOSPHATE 8 MG: 4 INJECTION, SOLUTION INTRA-ARTICULAR; INTRALESIONAL; INTRAMUSCULAR; INTRAVENOUS; SOFT TISSUE at 07:33

## 2022-10-19 RX ADMIN — ACETAMINOPHEN 1000 MG: 500 TABLET ORAL at 06:39

## 2022-10-19 ASSESSMENT — PAIN DESCRIPTION - PAIN TYPE
TYPE: SURGICAL PAIN

## 2022-10-19 ASSESSMENT — FIBROSIS 4 INDEX: FIB4 SCORE: 0.42

## 2022-10-19 NOTE — ANESTHESIA PROCEDURE NOTES
Peripheral Block    Date/Time: 10/19/2022 6:55 AM  Performed by: Lizandro Ba M.D.  Authorized by: Lizandro Ba M.D.     Patient Location:  Pre-op  Start Time:  10/19/2022 6:55 AM  End Time:  10/19/2022 7:00 AM  Reason for Block: at surgeon's request and post-op pain management ONLY    patient identified, IV checked, site marked, risks and benefits discussed, surgical consent, monitors and equipment checked, pre-op evaluation and timeout performed    Patient Position:  Supine  Prep: ChloraPrep    Monitoring:  Heart rate, continuous pulse ox and cardiac monitor  Block Region:  Upper Extremity  Upper Extremity - Block Type:  BRACHIAL PLEXUS block, Interscalene approach    Laterality:  Left  Procedures: ultrasound guided  Image captured, interpreted and electronically stored.  Local Infiltration:  Lidocaine  Strength:  1 %  Dose:  3 ml  Block Type:  Single-shot  Needle Length:  50mm  Needle Gauge:  22 G  Needle Localization:  Ultrasound guidance  Injection Assessment:  Negative aspiration for heme, no paresthesia on injection, incremental injection and local visualized surrounding nerve on ultrasound  Evidence of intravascular injection: No     US Guided Interscalene Brachial Plexus Block   US transducer placed on the neck in oblique plane approximately at the level of C6.  Anterior and Middle Scalene (MSM) muscles identified with nerve trunks identified between the muscles.  Needle inserted lateral to probe and advanced under direct visualization through the MSM into a perineural position.  After negative aspiration, LA injected with ease and visualized surrounding the nerve trunks.  US image in chart

## 2022-10-19 NOTE — ANESTHESIA TIME REPORT
Anesthesia Start and Stop Event Times     Date Time Event    10/19/2022 0655 Ready for Procedure     0726 Anesthesia Start     0857 Anesthesia Stop        Responsible Staff  10/19/22    Name Role Begin End    Lizandro Ba M.D. Anesth 0726 0857        Overtime Reason:  no overtime (within assigned shift)    Comments:

## 2022-10-19 NOTE — OR NURSING
"0915-report received from ANSELMO Ramirez. Care of pt assumed. VSS. Pt on RA tolerating popsicle    0925-shoulder Xray at bedside completed    0940-BP 82/56, pt not symptomatic. Pt did say they \"took their blood pressure medication this morning.\" Notified anesthesia.    0945-another liter of LR hung per anesthesia request. Last BP 90/54.    0950-pt to Phase II, report given to ANSELMO Spence  "

## 2022-10-19 NOTE — ANESTHESIA POSTPROCEDURE EVALUATION
Patient: Amina Wallace    Procedure Summary     Date: 10/19/22 Room / Location: [facility] ROOM 21 / SURGERY SAME DAY [facility]    Anesthesia Start: 0726 Anesthesia Stop: 0857    Procedure: LEFT ANATOMIC TOTAL SHOULDER ARTHROPLASTY (Left: Shoulder) Diagnosis:       Left shoulder pain      (Left shoulder pain, Left shoulder Arthritis )    Surgeons: Domingo aSntamaria M.D. Responsible Provider: Lizandro Ba M.D.    Anesthesia Type: general, peripheral nerve block ASA Status: 2          Final Anesthesia Type: general, peripheral nerve block  Last vitals  BP   Blood Pressure : (!) 94/65    Temp   36.1 °C (97 °F)    Pulse   79   Resp   16    SpO2   94 %      Anesthesia Post Evaluation    Patient location during evaluation: PACU  Patient participation: complete - patient participated  Level of consciousness: awake and alert    Airway patency: patent  Anesthetic complications: no  Cardiovascular status: hemodynamically stable  Respiratory status: acceptable  Hydration status: euvolemic    PONV: none          No notable events documented.     Nurse Pain Score: 0 (NPRS)

## 2022-10-19 NOTE — OR NURSING
0855- pt arrives from OR to PACU 1, report received from RN and anesthesia. Pt place on monitor. VSS,  NAD noted. O2 8L via mask.  Dressing to 9L) shoulder CDI, bruising noted to (L) axilla.  LUE in sling, CMS intact.   Ice placed     0909- report given to ANSELMO Cervantes  Pt given otter pop    0954- pt to Phase II via kwiry    1000- dresses self with minimal assistance    1040- Discharge instructions given to pt and friend, Ciro- verbalize understanding.   PIV d/c'd   1044- pt escorted out via w/c by RN, all belongings accounted for.

## 2022-10-19 NOTE — LETTER
September 8, 2022    Patient Name: Amina Wallace  Surgeon Name: Domingo Santamaria M.D.  Surgery Facility: [facility] ([address] [place] [address])  Surgery Date: 9/30/2022    The time of your surgery is not final and may change up to and until the day of your surgery. You will be contacted 24-48 hours prior to your surgery date with your check-in and surgery time.    If you will not be at one of the below numbers please call the surgery scheduler at [phone]  Preferred Phone: [phone]    COVID testing is not required for non-symptomatic vaccinated patients with proof of vaccination at [facility].  For un-vaccinated patients please refer to the following instructions for your COVID testing requirements:    COVID test required 4-7 days prior to surgery, failure to do so can result in a cancellation.    The following locations offer COVID testing:    Approved facilities for COVID testing, if scheduled at [facility]:  · [facility] from 7:30am-3:30pm at [address] [place] [address]  · [facility] [phone] (Please call for an appointment)  · Your local pharmacy    Instructions: Bring a list of all medications you are taking including the dosing and frequency.    DAY OF YOUR SURGERY  Nothing to eat or drink after midnight     Refrain from smoking any substance after midnight prior to surgery. Smoking may interfere with the anesthetic and frequently produces nausea during the recovery period.    Continue taking all lifesaving medications. Including the morning of your surgery with small sip of water.    Please do NOT take on the day of surgery:  Diuretics: examples- furosemide (Lasix), spironolactone, hydrochlorothiazide  ACE-inhibitors: examples- lisinopril, ramipril, enalapril  “ARBs”: examples- losartan, Olmesartan, valsartan    Please arrive at the hospital/surgery center at the check-in time provided.     An adult will need to bring you and take  you home after your surgery.     AFTER YOUR SURGERY  Post op Appointment:   Date: 10/13   Time: 4PM   With: Domingo Santamaria M.D.   Location: [address] [place] [id number]    TIME OFF WORK  FMLA or Disability forms can be faxed directly to: [phone] or you may drop them off at [address] [place] [address]. Our office charges a $35.00 fee per form. Forms will be completed within 10 business days of drop off and payment received. For the status of your forms you may contact our disability office directly [phone].    MEDICATION INSTRUCTIONS **Please read section completely**    The following medications should be stopped a minimum of 10 days prior to surgery:  All over the counter, Supplements & Herbal medications    Anorectics: Phentermine (Adipex-P, Lomaira and Suprenza), Phentermine-topiramate (Qsymia), Bupropion-naltrexone (Contrave)    Opiod Partial Agonists/Opioid Antagonists: Buprenorphine (Subocone, Belbuca, Butrans, Probuphine Implant, Sublocade), Naltrexone (ReVia, Vivitrol), Naloxone    Amphetamines: Dextroamphetamine/Amphetamine (Adderall, Mydayis), Methylphenidate Hydrochloride (Concerta, Metadate, Methylin, Ritalin)    The following medications should be stopped 5 days prior to surgery:  Blood Thinners: Any Aspirin, Aspirin products, anti-inflammatories such as ibuprofen and any blood thinners such as Coumadin and Plavix. Please consult your prescribing physician if you are on life saving blood thinners, in regards to when to stop medications prior to surgery.     The following medications should be stopped a minimum of 3 days prior to surgery:  PDE-5 inhibitors: Sildenafil (Viagra), Tadalafil (Cialis), Vardenafil (Levitra), Avanafil (Stendra)    MAO Inhibitors: Rasagiline (Azilect), Selegiline (Eldepryl, Emsam, Selapar), Isocarboxazid (Marplan), Phenelzine (Nardil)

## 2022-10-19 NOTE — DISCHARGE INSTRUCTIONS
HOME CARE INSTRUCTIONS    ACTIVITY: Rest and take it easy for the first 24 hours.  A responsible adult is recommended to remain with you during that time.  It is normal to feel sleepy.  We encourage you to not do anything that requires balance, judgment or coordination.    FOR 24 HOURS DO NOT:  Drive, operate machinery or run household appliances.  Drink beer or alcoholic beverages.  Make important decisions or sign legal documents.    SPECIAL INSTRUCTIONS:     Total Shoulder Instructions    I acknowledge receipt and understanding of these Home Care instructions.Dressing and wound care:      Keep your post-op dressing on and dry until you receive further instructions from your surgeon.  If your dressing leaks or becomes filled with blood, please contact your surgeon's office for further instructions.    Shower/Bathing:  If you have a waterproof dressing, you may shower at any time after surgery. Swimming pools, hot tubs, or baths are to be avoided until after your follow-up and then only if cleared by your surgeon.       Ice:   Apply an ice pack to your shoulder (20 minutes on the shoulder, 20 minutes off the shoulder), as you feel necessary to help with the pain and swelling.  If you have a cold therapy unit, use liberally as directed.     Sling / Shoulder Immobilizer:   The sling should be on except when bathing. Please defer to your surgeon for further instructions regarding shoulder-specific exercises.    Exercise:  Defer to your surgeon for any mobility to your shoulder and arm.  Keep your shoulder immobilizer on until you receive further instructions from your surgeon's office.     Nerve block:    Most total shoulder replacements are performed under both a general anesthetic as well as an interscalene nerve block.  The effects of the nerve block may last from 12-60 hours depending on the medication used.  In some cases, the nerve block may make you feel short of breath when you lie down.  It is recommended  that you sleep in a recliner or an inclined position while the nerve block remains in effect to limit this feeling and optimize your breathing.      Pain medication:   Take your pain medicine, as needed and prescribed.  Do not drive or operate machinery while taking narcotic pain medication.  You may start or resume anti-inflammatory medication (i.e. ibuprofen, naproxen) anytime after surgery, which should be taken with food to avoid stomach irritation.     SWELLING/BRUISING:  Swelling is an expected response to surgery. To reduce swelling, elevate your surgical limb above heart level multiple times per day and apply ice (see Ice instructions). If your swelling becomes excessive, is limiting your ability to move, or becomes worrisome please contact your doctor's office.  Bruising often does not appear until after you arrive home and can be quite dramatic-appearing purple, black, or green. Bruising is typically not concerning and will subside without any treatment.     Mega Hose:  Please wear the mega hose as much as possible for two weeks following surgery to help prevent blood clots and leg swelling.  Defer to your surgeon for instructions otherwise.      DIET: To avoid nausea, slowly advance diet as tolerated, avoiding spicy or greasy foods for the first day.  Add more substantial food to your diet according to your physician's instructions.  Babies can be fed formula or breast milk as soon as they are hungry.  INCREASE FLUIDS AND FIBER TO AVOID CONSTIPATION.        MEDICATIONS: Resume taking daily medication.  Take prescribed pain medication with food.  If no medication is prescribed, you may take non-aspirin pain medication if needed.  PAIN MEDICATION CAN BE VERY CONSTIPATING.  Take a stool softener or laxative such as senokot, pericolace, or milk of magnesia if needed.        A follow-up appointment should be arranged with your doctor; call to schedule.    You should CALL YOUR PHYSICIAN if you develop:  Fever  greater than 101 degrees F.  Pain not relieved by medication, or persistent nausea or vomiting.  Excessive bleeding (blood soaking through dressing) or unexpected drainage from the wound.  Extreme redness or swelling around the incision site, drainage of pus or foul smelling drainage.  Inability to urinate or empty your bladder within 8 hours.  Problems with breathing or chest pain.    You should call 911 if you develop problems with breathing or chest pain.  If you are unable to contact your doctor or surgical center, you should go to the nearest emergency room or urgent care center.  Physician's telephone #:     Dr. Santamaria at [phone]    MILD FLU-LIKE SYMPTOMS ARE NORMAL.  YOU MAY EXPERIENCE GENERALIZED MUSCLE ACHES, THROAT IRRITATION, HEADACHE AND/OR SOME NAUSEA.    If any questions arise, call your doctor.  If your doctor is not available, please feel free to call the Surgical Center at [phone].  The Center is open Monday through Friday from 7AM to 7PM.      A registered nurse may call you a few days after your surgery to see how you are doing after your procedure.    You may also receive a survey in the mail within the next two weeks and we ask that you take a few moments to complete the survey and return it to us.  Our goal is to provide you with very good care and we value your comments.     Depression / Suicide Risk    As you are discharged from this [facility] facility, it is important to learn how to keep safe from harming yourself.    Recognize the warning signs:  Abrupt changes in personality, positive or negative- including increase in energy   Giving away possessions  Change in eating patterns- significant weight changes-  positive or negative  Change in sleeping patterns- unable to sleep or sleeping all the time   Unwillingness or inability to communicate  Depression  Unusual sadness, discouragement and loneliness  Talk of wanting to die  Neglect of personal  appearance   Rebelliousness- reckless behavior  Withdrawal from people/activities they love  Confusion- inability to concentrate     If you or a loved one observes any of these behaviors or has concerns about self-harm, here's what you can do:  Talk about it- your feelings and reasons for harming yourself  Remove any means that you might use to hurt yourself (examples: pills, rope, extension cords, firearm)  Get professional help from the community (Mental Health, Substance Abuse, psychological counseling)  Do not be alone:Call your Safe Contact- someone whom you trust who will be there for you.  Call your local CRISIS HOTLINE 614-1897 or 667-647-1713  Call your local Children's Mobile Crisis Response Team [place] (176) 970-7412 or www.[employer]uro  Call the toll free National Suicide Prevention Hotlines   National Suicide Prevention Lifeline 851-724-CQIE (0180)  National Hope Line Network 800-SUICIDE (089-6048)

## 2022-10-19 NOTE — H&P
Surgery Orthopedic History & Physical Note    Date  10/19/2022    Primary Care Physician  Nikolay Mauro M.D.      Pre-Op Diagnosis Codes:     * Left shoulder pain [M25.512]    HPI  This is a 67 y.o. female who presented with left chronic atraumatic shoulder pain, failed conservative treatment.  The patient has had minimal relief with activity modification, rest, over-the-counter pain medications and injections.She denies prior surgery at the left shoulder.  She denies numbness about the upper extremity.  She has pain ranging from 5-9 out of 10 in severity.  It is an aching pain rating down her arm from her shoulder.  Particularly at night with activities and notices crepitation.    Past Medical History:   Diagnosis Date    Cataract 2019    bilat    High cholesterol     Hyperlipidemia 03/19/2012    Hypertension        Past Surgical History:   Procedure Laterality Date    KNEE ARTHROPLASTY TOTAL         Current Facility-Administered Medications   Medication Dose Route Frequency Provider Last Rate Last Admin    lactated ringers infusion   Intravenous Continuous Domingo Santamaria M.D.        ceFAZolin (ANCEF) injection 2 g  2 g Intravenous Once Domingo Santamaria M.D.           Social History     Socioeconomic History    Marital status: Single     Spouse name: Not on file    Number of children: Not on file    Years of education: Not on file    Highest education level: Not on file   Occupational History    Not on file   Tobacco Use    Smoking status: Never    Smokeless tobacco: Never   Vaping Use    Vaping Use: Never used   Substance and Sexual Activity    Alcohol use: Yes     Comment: 1 per day    Drug use: Yes     Comment: liquid CBD    Sexual activity: Yes     Partners: Male   Other Topics Concern    Not on file   Social History Narrative    Not on file     Social Determinants of Health     Financial Resource Strain: Not on file   Food Insecurity: Not on file   Transportation Needs: Not on file    Physical Activity: Not on file   Stress: Not on file   Social Connections: Not on file   Intimate Partner Violence: Not on file   Housing Stability: Not on file       Family History   Problem Relation Age of Onset    Cancer Mother         cervical    Parkinson's Disease Father     Hypertension Father     Hyperlipidemia Father     No Known Problems Brother        Allergies  Penicillins    Review of Systems  Negative    Physical Exam    Vital Signs  Blood Pressure : 107/77   Temperature: 36.1 °C (96.9 °F)   Pulse: 73   Respiration: 17   Pulse Oximetry: 97 %   General:  Well appearing, in no acute distress. Appropriate and cooperative with the exam.  HEENT: Normocephalic, atraumatic. Cranial nerves II-XII are grossly intact.  Cardiovascular: 2+ distal pulses. No cyanosis, clubbing, or edema.  Pulmonary: Breathing comfortably on room air without labor.  Abdomen: Soft and unremarkable.  Skin: No rashes, jaundice, or cyanosis.  Lymphatic: No epitrochlear lymphadenopathy.  Spine:  Clinically midline.   Gait:  Patient ambulates without a limp.  Musculoskeletal: Left shoulder limited and guarded range of motion with crepitation, good rotator cuff strength.  Neurologic sensation tact light touch about the left upper extremity.      Radiology:  Radiographs and MRI of the left shoulder consistent with severe osteoarthritis with an intact rotator cuff      Assessment/Plan:  Pre-Op Diagnosis Codes:     * Left shoulder pain [M25.512], consistent with the severe left shoulder osteoarthritis and an intact rotator cuff.  Procedure(s):  LEFT ANATOMIC TOTAL SHOULDER ARTHROPLASTY

## 2022-10-19 NOTE — LETTER
September 20, 2022    Patient Name: Amina Wallace  Surgeon Name: Domingo Santamaria M.D.  Surgery Facility: [facility] ([address] [facility])  Surgery Date: 10/19/2022    The time of your surgery is not final and may change up to and until the day of your surgery. You will be contacted 24-48 hours prior to your surgery date with your check-in and surgery time.    If you will not be at one of the below numbers please call the surgery scheduler at [phone]  Preferred Phone: [phone]    BEFORE YOUR SURGERY   Pre Registration and/or Lab Work must be done within and no earlier than 28 days prior to your surgery date. Please call [facility] at [phone] for an appointment as soon as possible.    Instructions: Bring a list of all medications you are taking including the dosing and frequency.    DAY OF YOUR SURGERY  Nothing to eat or drink after midnight     Refrain from smoking any substance after midnight prior to surgery. Smoking may interfere with the anesthetic and frequently produces nausea during the recovery period.    Continue taking all lifesaving medications. Including the morning of your surgery with small sip of water.    Please do NOT take on the day of surgery:  Diuretics: examples- furosemide (Lasix), spironolactone, hydrochlorothiazide  ACE-inhibitors: examples- lisinopril, ramipril, enalapril  “ARBs”: examples- losartan, Olmesartan, valsartan    Please arrive at the hospital/surgery center at the check-in time provided.     An adult will need to bring you and take you home after your surgery.     AFTER YOUR SURGERY  Post op Appointment:   Date: 11/1   Time: 11:15AM   With: Domingo Santamaria M.D.   Location: [address] [place] [address]      TIME OFF WORK  FMLA or Disability forms can be faxed directly to: [phone] or you may drop them off at [address] [place] [address]. Our office charges a $35.00 fee per form. Forms will be completed  within 10 business days of drop off and payment received. For the status of your forms you may contact our disability office directly [phone].    MEDICATION INSTRUCTIONS **Please read section completely**    The following medications should be stopped a minimum of 10 days prior to surgery:  All over the counter, Supplements & Herbal medications    Anorectics: Phentermine (Adipex-P, Lomaira and Suprenza), Phentermine-topiramate (Qsymia), Bupropion-naltrexone (Contrave)    Opiod Partial Agonists/Opioid Antagonists: Buprenorphine (Subocone, Belbuca, Butrans, Probuphine Implant, Sublocade), Naltrexone (ReVia, Vivitrol), Naloxone    Amphetamines: Dextroamphetamine/Amphetamine (Adderall, Mydayis), Methylphenidate Hydrochloride (Concerta, Metadate, Methylin, Ritalin)    The following medications should be stopped 5 days prior to surgery:  Blood Thinners: Any Aspirin, Aspirin products, anti-inflammatories such as ibuprofen and any blood thinners such as Coumadin and Plavix. Please consult your prescribing physician if you are on life saving blood thinners, in regards to when to stop medications prior to surgery.     The following medications should be stopped a minimum of 3 days prior to surgery:  PDE-5 inhibitors: Sildenafil (Viagra), Tadalafil (Cialis), Vardenafil (Levitra), Avanafil (Stendra)    MAO Inhibitors: Rasagiline (Azilect), Selegiline (Eldepryl, Emsam, Selapar), Isocarboxazid (Marplan), Phenelzine (Nardil)

## 2022-10-19 NOTE — OP REPORT
SURGEON:  Domingo Santamaria MD     ASSISTANT:  None     PREOPERATIVE DIAGNOSIS:  Left shoulder osteoarthritis.     POSTOPERATIVE DIAGNOSES:  Left shoulder osteoarthritis and long head biceps   tendinopathy.     PROCEDURES:  1.  Left shoulder anatomic total shoulder arthroplasty.  2.  Left open biceps tenodesis.     IMPLANTS:  Catalyst resurfacing anatomic total shoulder arthroplasty system,   cemented size Small augmented glenoid and size Bpress fit humeral head.     ANESTHESIOLOGIST:  MD Rodney     ANESTHESIA:  General with upper extremity nerve block for pain control.     COMPLICATIONS:  None noted.     DRAINS:  none     SPECIMENS:  None.     ESTIMATED BLOOD LOSS:  100ml.     INDICATIONS: This patient is well known to me through   my outpatient clinic for the above-mentioned diagnosis.  The patient believes and I would agree they has failed conservative treatment and he requests the above mentioned procedure. Prior to the procedure, he understood the risks, benefits and alternatives of surgery.  The patient understood the risks to include but not limited to the risk of infection requiring repeat surgery, bleeding, blood transfusion, nerve, blood vessel, tendon injury requiring   repair, chronic pain, periprosthetic fracture, subsidence, loosening,   dislocation requiring revision surgery, dissatisfaction with his outcome,   acquiring coronavirus and its complications, DVT, pulmonary embolism, aortic   stroke and even death.  The patient states despite these risks, theywished to   proceed with surgery.     DESCRIPTION OF PROCEDURE:  The patient was met in the preoperative holding   area.  His left shoulder was initialed as the correct operative site.  They had   an opportunity to ask questions.  All questions were answered and informed   consent was obtained.     The patient was brought to the operating room, laid supine on the operating   room table.  All bony and dependent prominences were well padded.  Uper    extremity nerve block and general anesthesia were induced without known  complication.  Ancef and clindamycin were administered for infection prophylaxis.   Tranexamic acid was administered for hemostasis.  He was placed in well-padded beach chair position.  Right upper extremity was prepped and draped in the usual sterile fashion.  A formal timeout was performed, in which all parties agreed upon the correct patient, procedure and operative   site.  We began the procedure by performing a deltopectoral approach   retracting cephalic vein medially.  I released the upper third pectoralis   major tendon to mobilize the shoulder. Deep to this, I identified the long head   biceps tendon, which was extremely tendinopathic.  To address this, I   performed an open biceps tenodesis using 0 Ethibond sutures securing the   biceps tendon to the upper half of pectoralis major tendon.  I released the   proximal, followed into the rotator interval.  I noted this rotator cuff was   intact.  I released the biceps off the superior aspect of the glenoid.  I then   performed a subscapularis tenotomy tagging the medial side for later repair.    I dislocated the shoulder.  I used the Catalyst cutting guide to make   measured cuts per their technique.  I then placed a cut protector, turned my   attention to the glenoid by placing glenoid retractors.  I released the   capsule off the deep portion of the subscapularis to mobilize subscapularis.    I then circumferentially excised the labrum off the glenoid, while palpating  and protecting the axillary nerve.  There was moderate glenohumeral joint   osteoarthritis particularly the inferior aspect of the joint both on the   glenoid and humeral side.  I then measured for a size medium for the glenoid,   drilled for the pegs and cemented in a size small polyethylene.  The wear was assymmetric posterior about the glenoid. I therefore used a posterior augmentation glenoid.  I turned my    attention back to the humerus, removed osteophytes and sized it for an B  humeral head.  It had excellent press fit.  There was a 1 x 1 cm cyst in the humeral head which was removed and filled with bone cement, limited to the cyst area.  We reduced the shoulder in   excellent range of motion and posterior translation of 50%.  I then copiously   irrigated the joint with normal saline.  I repaired subscapularis with #5   Ti-Cron suture with the arm in 30 degrees of external rotation.  The repair of the tendon was not without tension or gapping.  I then closed the skin with2-0 Monocryl suture, skin staples, all closed with Xeroform, 4 x 4's, Ioban and a sling.  The patient awoke from anesthesia without known complication and was transferred in a stable condition to the PACU where there were no immediate postoperative complaints.     POSTOPERATIVE PLAN:  The patient will be discharged home per same day criteria, sedentary use of the hand and follow up in 10 to 14 days for staple removal and wound check.

## 2022-10-19 NOTE — ANESTHESIA PREPROCEDURE EVALUATION
Case: [id number] Date/Time: 10/19/22 0745    Procedure: LEFT ANATOMIC TOTAL SHOULDER ARTHROPLASTY    Diagnosis: Left shoulder pain [M25.512]    Pre-op diagnosis: Left shoulder pain [M25.512]    Location: [facility] ROOM 21 / SURGERY SAME DAY [facility]    Surgeons: Domingo Santamaria M.D.          Relevant Problems   CARDIAC   (positive) HTN (hypertension)      Other   (positive) Left shoulder pain       Physical Exam    Airway   Mallampati: II  TM distance: >3 FB  Neck ROM: full       Cardiovascular - normal exam  Rhythm: regular  Rate: normal  (-) murmur     Dental - normal exam           Pulmonary - normal exam  Breath sounds clear to auscultation     Abdominal    Neurological - normal exam                 Anesthesia Plan    ASA 2       Plan - general and peripheral nerve block     Peripheral nerve block will be post-op pain control  Airway plan will be ETT          Induction: intravenous    Postoperative Plan: Postoperative administration of opioids is intended.    Pertinent diagnostic labs and testing reviewed    Informed Consent:    Anesthetic plan and risks discussed with patient.    Use of blood products discussed with: patient whom consented to blood products.

## 2022-10-19 NOTE — ANESTHESIA PROCEDURE NOTES
Airway    Date/Time: 10/19/2022 7:34 AM  Performed by: Lizandro Ba M.D.  Authorized by: Lizandro Ba M.D.     Location:  OR  Urgency:  Elective  Indications for Airway Management:  Anesthesia      Spontaneous Ventilation: absent    Sedation Level:  Deep  Preoxygenated: Yes    Patient Position:  Sniffing  Final Airway Type:  Endotracheal airway  Final Endotracheal Airway:  ETT  Cuffed: Yes    Technique Used for Successful ETT Placement:  Direct laryngoscopy  Devices/Methods Used in Placement:  Anterior pressure/BURP    Insertion Site:  Oral  Blade Type:  Delfina  Laryngoscope Blade/Videolaryngoscope Blade Size:  3  ETT Size (mm):  7.0  Measured from:  Teeth  ETT to Teeth (cm):  22  Placement Verified by: auscultation and capnometry    Cormack-Lehane Classification:  Grade IIb - view of arytenoids or posterior of glottis only  Number of Attempts at Approach:  1

## 2022-11-01 ENCOUNTER — DOCUMENTATION (OUTPATIENT)
Dept: HEALTH INFORMATION MANAGEMENT | Facility: OTHER | Age: 67
End: 2022-11-01
Payer: MEDICARE

## 2022-12-26 ENCOUNTER — HOSPITAL ENCOUNTER (OUTPATIENT)
Facility: MEDICAL CENTER | Age: 67
End: 2022-12-26
Payer: MEDICARE

## 2022-12-26 PROCEDURE — 82274 ASSAY TEST FOR BLOOD FECAL: CPT

## 2023-01-01 LAB — IMM ASSAY OCC BLD FITOB: POSITIVE

## 2023-02-06 ENCOUNTER — APPOINTMENT (OUTPATIENT)
Dept: MEDICAL GROUP | Facility: MEDICAL CENTER | Age: 68
End: 2023-02-06
Payer: MEDICARE

## 2023-03-15 ENCOUNTER — HOSPITAL ENCOUNTER (OUTPATIENT)
Dept: LAB | Facility: MEDICAL CENTER | Age: 68
End: 2023-03-15
Attending: FAMILY MEDICINE
Payer: MEDICARE

## 2023-03-15 DIAGNOSIS — E78.2 MIXED HYPERLIPIDEMIA: ICD-10-CM

## 2023-03-15 LAB
ALBUMIN SERPL BCP-MCNC: 4.4 G/DL (ref 3.2–4.9)
ALBUMIN/GLOB SERPL: 1.5 G/DL
ALP SERPL-CCNC: 103 U/L (ref 30–99)
ALT SERPL-CCNC: 50 U/L (ref 2–50)
ANION GAP SERPL CALC-SCNC: 14 MMOL/L (ref 7–16)
AST SERPL-CCNC: 19 U/L (ref 12–45)
BILIRUB SERPL-MCNC: 0.6 MG/DL (ref 0.1–1.5)
BUN SERPL-MCNC: 17 MG/DL (ref 8–22)
CALCIUM ALBUM COR SERPL-MCNC: 9.5 MG/DL (ref 8.5–10.5)
CALCIUM SERPL-MCNC: 9.8 MG/DL (ref 8.5–10.5)
CHLORIDE SERPL-SCNC: 102 MMOL/L (ref 96–112)
CHOLEST SERPL-MCNC: 202 MG/DL (ref 100–199)
CO2 SERPL-SCNC: 21 MMOL/L (ref 20–33)
CREAT SERPL-MCNC: 0.65 MG/DL (ref 0.5–1.4)
GFR SERPLBLD CREATININE-BSD FMLA CKD-EPI: 96 ML/MIN/1.73 M 2
GLOBULIN SER CALC-MCNC: 3 G/DL (ref 1.9–3.5)
GLUCOSE SERPL-MCNC: 84 MG/DL (ref 65–99)
HDLC SERPL-MCNC: 66 MG/DL
LDLC SERPL CALC-MCNC: 101 MG/DL
POTASSIUM SERPL-SCNC: 4.1 MMOL/L (ref 3.6–5.5)
PROT SERPL-MCNC: 7.4 G/DL (ref 6–8.2)
SODIUM SERPL-SCNC: 137 MMOL/L (ref 135–145)
TRIGL SERPL-MCNC: 174 MG/DL (ref 0–149)

## 2023-03-15 PROCEDURE — 80053 COMPREHEN METABOLIC PANEL: CPT

## 2023-03-15 PROCEDURE — 36415 COLL VENOUS BLD VENIPUNCTURE: CPT

## 2023-03-15 PROCEDURE — 80061 LIPID PANEL: CPT

## 2023-03-27 ENCOUNTER — OFFICE VISIT (OUTPATIENT)
Dept: MEDICAL GROUP | Facility: MEDICAL CENTER | Age: 68
End: 2023-03-27
Payer: MEDICARE

## 2023-03-27 VITALS
BODY MASS INDEX: 22.13 KG/M2 | TEMPERATURE: 97.4 F | HEIGHT: 64 IN | HEART RATE: 100 BPM | SYSTOLIC BLOOD PRESSURE: 112 MMHG | OXYGEN SATURATION: 96 % | DIASTOLIC BLOOD PRESSURE: 64 MMHG | WEIGHT: 129.63 LBS

## 2023-03-27 DIAGNOSIS — M25.511 CHRONIC RIGHT SHOULDER PAIN: ICD-10-CM

## 2023-03-27 DIAGNOSIS — G89.29 CHRONIC RIGHT SHOULDER PAIN: ICD-10-CM

## 2023-03-27 DIAGNOSIS — E78.2 MIXED HYPERLIPIDEMIA: ICD-10-CM

## 2023-03-27 DIAGNOSIS — I10 PRIMARY HYPERTENSION: ICD-10-CM

## 2023-03-27 PROCEDURE — 99214 OFFICE O/P EST MOD 30 MIN: CPT | Performed by: FAMILY MEDICINE

## 2023-03-27 RX ORDER — HYDROCODONE BITARTRATE AND ACETAMINOPHEN 5; 325 MG/1; MG/1
1 TABLET ORAL
Qty: 30 TABLET | Refills: 0 | Status: SHIPPED | OUTPATIENT
Start: 2023-03-27 | End: 2023-04-26

## 2023-03-27 RX ORDER — NALOXONE HYDROCHLORIDE 4 MG/.1ML
SPRAY NASAL
Qty: 1 EACH | Refills: 0 | Status: SHIPPED | OUTPATIENT
Start: 2023-03-27

## 2023-03-27 ASSESSMENT — ENCOUNTER SYMPTOMS
FEVER: 0
PALPITATIONS: 0
CHILLS: 0

## 2023-03-27 ASSESSMENT — PATIENT HEALTH QUESTIONNAIRE - PHQ9: CLINICAL INTERPRETATION OF PHQ2 SCORE: 0

## 2023-03-27 ASSESSMENT — FIBROSIS 4 INDEX: FIB4 SCORE: 0.43

## 2023-03-27 NOTE — ASSESSMENT & PLAN NOTE
Chronic problem, stable, continue Norco 5 325 mg 1 time a day as needed for pain.  Check PDMP.  Controlled prescription contract signed in chart.  We discussed about urine drug screen.  She is in hurry today as she is renovating her kitchen.  She will give us urine sample next week.  I will send medication 30 tablets to pharmacy.  Discussed with patient that alcohol and marijuana interact with this medication.  Patient reports understanding    Obtained and reviewed patient utilization report from [facility] pharmacy database on 3/27/2023 1:24 PM  prior to writing prescription for controlled substance II, III or IV per [place] bill . Based on assessment of the report,my physical exam if necessary and the patient's health problem, the prescription is medically necessary.

## 2023-03-27 NOTE — PROGRESS NOTES
FAMILY MEDICINE VISIT                                                               Chief complaint::Diagnoses of Chronic right shoulder pain, Primary hypertension, and Mixed hyperlipidemia were pertinent to this visit.    History of present illness: Amina Wallace is a 67 y.o. female who presented for lab follow-up, medication refill    Problem   Chronic Right Shoulder Pain    X-ray right shoulder in November 21 showed There is severe bone-on-bone arthritis of the glenohumeral joints.  The   humeral heads are located within the glenoids.  There is extensive   osteophyte formation around the humeral heads  she is currently on Norco 5-325 milligrams as needed for pain.  Her last refill was last year.  She uses this medication few times in a a month.  She follows with orthopedics and had surgery on her left shoulder.  This year she is planning to get surgery on her right shoulder.     Htn (Hypertension)    Blood pressure today 112/64.  She is on hydrochlorothiazide 25 mg daily and lisinopril 40 mg daily.  Doing well with this medication.  Recent kidney function, electrolytes came back in normal range.     Hyperlipidemia    She is currently on Lipitor 80 mg daily and ezetimibe 10 mg daily.  Cholesterol numbers are improving when compared to previous numbers    Lab Results   Component Value Date/Time    CHOLSTRLTOT 202 (H) 03/15/2023 1025    TRIGLYCERIDE 174 (H) 03/15/2023 1025    HDL 66 03/15/2023 1025     (H) 03/15/2023 1025               Review of systems:     Review of Systems   Constitutional:  Negative for chills, fever and malaise/fatigue.   Cardiovascular:  Negative for chest pain, palpitations and leg swelling.   Musculoskeletal:  Positive for joint pain.        Right shoulder pain      Medications and Allergies:     Current Outpatient Medications   Medication Sig Dispense Refill    HYDROcodone-acetaminophen (NORCO) 5-325 MG Tab per tablet Take 1 Tablet by mouth 1 time a day as needed (Moderate to  "severe pain) for up to 30 days. 30 Tablet 0    Naloxone (NARCAN) 4 MG/0.1ML Liquid One spray in one nostril for overdose and call 911. 1 Each 0    amoxicillin (AMOXIL) 500 MG Cap TAKE 4 CAPS 1 HOUR PRIOR TO DENTAL CLEANING 4 Capsule 3    Coenzyme Q10 (CO Q 10 PO) Take 1 Capsule by mouth every day.      aspirin (ASA) 81 MG Chew Tab chewable tablet Chew 81 mg every day.      Celestina, Zingiber officinalis, (CELESTINA PO) Take  by mouth.      Ergocalciferol (VITAMIN D2 PO) Take  by mouth.      CRANBERRY EXTRACT PO Take  by mouth.      CINNAMON PO Take  by mouth.      Ferrous Sulfate (IRON PO) Take  by mouth.      ezetimibe (ZETIA) 10 MG Tab Take 1 Tablet by mouth every day. 90 Tablet 3    lisinopril (PRINIVIL) 40 MG tablet Take 1 Tablet by mouth every day. 90 Tablet 3    atorvastatin (LIPITOR) 80 MG tablet Take 1 Tablet by mouth every evening. (Patient taking differently: Take 80 mg by mouth every day.) 90 Tablet 3    hydroCHLOROthiazide (HYDRODIURIL) 25 MG Tab Take 1 Tablet by mouth every day. 90 Tablet 3     No current facility-administered medications for this visit.          Vitals:    /64 (BP Location: Left arm, Patient Position: Sitting, BP Cuff Size: Adult)   Pulse 100   Temp 36.3 °C (97.4 °F) (Temporal)   Ht 1.626 m (5' 4\")   Wt 58.8 kg (129 lb 10.1 oz)   SpO2 96%  Body mass index is 22.25 kg/m².    Physical Exam:     Physical Exam  Constitutional:       Appearance: Normal appearance. She is well-developed and well-groomed.   HENT:      Head: Normocephalic and atraumatic.      Right Ear: External ear normal.      Left Ear: External ear normal.   Eyes:      General:         Right eye: No discharge.         Left eye: No discharge.      Conjunctiva/sclera: Conjunctivae normal.   Cardiovascular:      Rate and Rhythm: Normal rate.   Pulmonary:      Effort: Pulmonary effort is normal. No respiratory distress.   Musculoskeletal:      Cervical back: Neck supple.   Skin:     Findings: No rash.   Neurological:      " Mental Status: She is alert.   Psychiatric:         Mood and Affect: Mood and affect normal.         Behavior: Behavior normal.        Labs:  I reviewed with patient recent labs resulted on 3/15/2023    Assessment/Plan:         Problem List Items Addressed This Visit       Hyperlipidemia     Chronic problem, uncontrolled, improving, continue Lipitor 80 mg daily and ezetimibe 10 mg daily.  Recheck labs in 6 months.         Relevant Orders    Comp Metabolic Panel    Lipid Profile    HTN (hypertension)     Chronic problem, stable, continue hydrochlorothiazide 25 mg daily and lisinopril 40 mg daily.         Chronic right shoulder pain     Chronic problem, stable, continue Norco 5 325 mg 1 time a day as needed for pain.  Check PDMP.  Controlled prescription contract signed in chart.  We discussed about urine drug screen.  She is in hurry today as she is renovating her kitchen.  She will give us urine sample next week.  I will send medication 30 tablets to pharmacy.  Discussed with patient that alcohol and marijuana interact with this medication.  Patient reports understanding    Obtained and reviewed patient utilization report from [facility] pharmacy database on 3/27/2023 1:24 PM  prior to writing prescription for controlled substance II, III or IV per [place] bill . Based on assessment of the report,my physical exam if necessary and the patient's health problem, the prescription is medically necessary.          Relevant Medications    HYDROcodone-acetaminophen (NORCO) 5-325 MG Tab per tablet    Naloxone (NARCAN) 4 MG/0.1ML Liquid    Other Relevant Orders    Controlled Substance Treatment Agreement        Please note that this dictation was created using voice recognition software. I have made every reasonable attempt to correct obvious errors, but I expect that there are errors of grammar and possibly content that I did not discover before finalizing the note.    Follow up in 6 months for medication and lab  follow-up.

## 2023-03-27 NOTE — ASSESSMENT & PLAN NOTE
Chronic problem, uncontrolled, improving, continue Lipitor 80 mg daily and ezetimibe 10 mg daily.  Recheck labs in 6 months.

## 2023-04-03 ENCOUNTER — APPOINTMENT (OUTPATIENT)
Dept: MEDICAL GROUP | Facility: MEDICAL CENTER | Age: 68
End: 2023-04-03
Payer: MEDICARE

## 2023-04-03 DIAGNOSIS — Z51.81 MEDICATION MONITORING ENCOUNTER: ICD-10-CM

## 2023-06-06 ENCOUNTER — TELEPHONE (OUTPATIENT)
Dept: HEALTH INFORMATION MANAGEMENT | Facility: OTHER | Age: 68
End: 2023-06-06

## 2023-06-23 ENCOUNTER — DOCUMENTATION (OUTPATIENT)
Dept: HEALTH INFORMATION MANAGEMENT | Facility: OTHER | Age: 68
End: 2023-06-23
Payer: MEDICARE

## 2023-09-20 ENCOUNTER — HOSPITAL ENCOUNTER (OUTPATIENT)
Dept: LAB | Facility: MEDICAL CENTER | Age: 68
End: 2023-09-20
Attending: FAMILY MEDICINE
Payer: MEDICARE

## 2023-09-20 DIAGNOSIS — E78.2 MIXED HYPERLIPIDEMIA: ICD-10-CM

## 2023-09-20 LAB
ALBUMIN SERPL BCP-MCNC: 4.7 G/DL (ref 3.2–4.9)
ALBUMIN/GLOB SERPL: 1.7 G/DL
ALP SERPL-CCNC: 107 U/L (ref 30–99)
ALT SERPL-CCNC: 59 U/L (ref 2–50)
ANION GAP SERPL CALC-SCNC: 13 MMOL/L (ref 7–16)
AST SERPL-CCNC: 23 U/L (ref 12–45)
BILIRUB SERPL-MCNC: 0.5 MG/DL (ref 0.1–1.5)
BUN SERPL-MCNC: 20 MG/DL (ref 8–22)
CALCIUM ALBUM COR SERPL-MCNC: 9.3 MG/DL (ref 8.5–10.5)
CALCIUM SERPL-MCNC: 9.9 MG/DL (ref 8.5–10.5)
CHLORIDE SERPL-SCNC: 102 MMOL/L (ref 96–112)
CHOLEST SERPL-MCNC: 210 MG/DL (ref 100–199)
CO2 SERPL-SCNC: 23 MMOL/L (ref 20–33)
CREAT SERPL-MCNC: 0.65 MG/DL (ref 0.5–1.4)
GFR SERPLBLD CREATININE-BSD FMLA CKD-EPI: 96 ML/MIN/1.73 M 2
GLOBULIN SER CALC-MCNC: 2.7 G/DL (ref 1.9–3.5)
GLUCOSE SERPL-MCNC: 106 MG/DL (ref 65–99)
HDLC SERPL-MCNC: 88 MG/DL
LDLC SERPL CALC-MCNC: 90 MG/DL
POTASSIUM SERPL-SCNC: 4.3 MMOL/L (ref 3.6–5.5)
PROT SERPL-MCNC: 7.4 G/DL (ref 6–8.2)
SODIUM SERPL-SCNC: 138 MMOL/L (ref 135–145)
TRIGL SERPL-MCNC: 161 MG/DL (ref 0–149)

## 2023-09-20 PROCEDURE — 80061 LIPID PANEL: CPT

## 2023-09-20 PROCEDURE — 36415 COLL VENOUS BLD VENIPUNCTURE: CPT

## 2023-09-20 PROCEDURE — 80053 COMPREHEN METABOLIC PANEL: CPT

## 2023-09-27 ENCOUNTER — APPOINTMENT (OUTPATIENT)
Dept: MEDICAL GROUP | Facility: MEDICAL CENTER | Age: 68
End: 2023-09-27
Payer: MEDICARE

## 2023-09-27 DIAGNOSIS — E78.2 MIXED HYPERLIPIDEMIA: ICD-10-CM

## 2023-09-27 DIAGNOSIS — I10 PRIMARY HYPERTENSION: ICD-10-CM

## 2023-09-27 RX ORDER — EZETIMIBE 10 MG/1
10 TABLET ORAL DAILY
Qty: 100 TABLET | Refills: 3 | Status: SHIPPED | OUTPATIENT
Start: 2023-09-27

## 2023-09-27 RX ORDER — LISINOPRIL 40 MG/1
40 TABLET ORAL DAILY
Qty: 100 TABLET | Refills: 3 | Status: SHIPPED | OUTPATIENT
Start: 2023-09-27

## 2023-09-27 RX ORDER — ATORVASTATIN CALCIUM 80 MG/1
80 TABLET, FILM COATED ORAL EVERY EVENING
Qty: 100 TABLET | Refills: 3 | Status: SHIPPED | OUTPATIENT
Start: 2023-09-27 | End: 2024-01-24

## 2023-09-27 RX ORDER — HYDROCHLOROTHIAZIDE 25 MG/1
25 TABLET ORAL DAILY
Qty: 100 TABLET | Refills: 3 | Status: SHIPPED | OUTPATIENT
Start: 2023-09-27

## 2023-09-27 NOTE — TELEPHONE ENCOUNTER
Received request via: Patient    Was the patient seen in the last year in this department? Yes    Does the patient have an active prescription (recently filled or refills available) for medication(s) requested? No    Does the patient have nursing home Plus and need 100 day supply (blood pressure, diabetes and cholesterol meds only)? Yes, quantity updated to 100 days    Pt had an appt today that was cancelled due to provider ooo

## 2023-10-24 ENCOUNTER — OFFICE VISIT (OUTPATIENT)
Dept: MEDICAL GROUP | Facility: MEDICAL CENTER | Age: 68
End: 2023-10-24
Payer: MEDICARE

## 2023-10-24 VITALS
RESPIRATION RATE: 16 BRPM | OXYGEN SATURATION: 95 % | WEIGHT: 127.87 LBS | BODY MASS INDEX: 21.83 KG/M2 | DIASTOLIC BLOOD PRESSURE: 60 MMHG | HEIGHT: 64 IN | SYSTOLIC BLOOD PRESSURE: 112 MMHG | HEART RATE: 87 BPM | TEMPERATURE: 97.9 F

## 2023-10-24 DIAGNOSIS — L98.9 SKIN LESION OF BACK: ICD-10-CM

## 2023-10-24 DIAGNOSIS — M25.511 CHRONIC RIGHT SHOULDER PAIN: ICD-10-CM

## 2023-10-24 DIAGNOSIS — E78.2 MIXED HYPERLIPIDEMIA: ICD-10-CM

## 2023-10-24 DIAGNOSIS — R32 URINARY INCONTINENCE, UNSPECIFIED TYPE: ICD-10-CM

## 2023-10-24 DIAGNOSIS — I10 PRIMARY HYPERTENSION: ICD-10-CM

## 2023-10-24 DIAGNOSIS — R79.89 ELEVATED LFTS: ICD-10-CM

## 2023-10-24 DIAGNOSIS — G89.29 CHRONIC RIGHT SHOULDER PAIN: ICD-10-CM

## 2023-10-24 DIAGNOSIS — R74.8 ALKALINE PHOSPHATASE ELEVATION: ICD-10-CM

## 2023-10-24 DIAGNOSIS — R73.01 ELEVATED FASTING GLUCOSE: ICD-10-CM

## 2023-10-24 DIAGNOSIS — Z12.31 ENCOUNTER FOR SCREENING MAMMOGRAM FOR MALIGNANT NEOPLASM OF BREAST: ICD-10-CM

## 2023-10-24 PROCEDURE — 3078F DIAST BP <80 MM HG: CPT | Performed by: FAMILY MEDICINE

## 2023-10-24 PROCEDURE — 99214 OFFICE O/P EST MOD 30 MIN: CPT | Performed by: FAMILY MEDICINE

## 2023-10-24 PROCEDURE — 3074F SYST BP LT 130 MM HG: CPT | Performed by: FAMILY MEDICINE

## 2023-10-24 ASSESSMENT — ENCOUNTER SYMPTOMS
CHILLS: 0
FEVER: 0
PALPITATIONS: 0

## 2023-10-24 ASSESSMENT — FIBROSIS 4 INDEX: FIB4 SCORE: 0.49

## 2023-10-24 NOTE — ASSESSMENT & PLAN NOTE
Chronic problem, stable, continue same meds Lipitor and ezetimibe.  Recheck labs before next visit

## 2023-10-24 NOTE — ASSESSMENT & PLAN NOTE
Chronic problem, stable, cardiac stimulants.  Discussed with patient that she is due for urine drug screen.  She reports that she had few drinks of alcohol in the last few days.  She does not drink alcohol with this medication.  She uses this medication on an occasional basis.  Recommended patient to not drink alcohol and follow-up next week for urine drug screen.

## 2023-10-24 NOTE — ASSESSMENT & PLAN NOTE
Chronic problem, unstable, check alkaline phosphatase isoenzymes to check for liver fraction and bone fraction

## 2023-10-24 NOTE — PROGRESS NOTES
FAMILY MEDICINE VISIT                                                               Chief complaint::Diagnoses of Chronic right shoulder pain, Mixed hyperlipidemia, Primary hypertension, Alkaline phosphatase elevation, Elevated fasting glucose, Urinary incontinence, unspecified type, Skin lesion of back, Elevated LFTs, and Encounter for screening mammogram for malignant neoplasm of breast were pertinent to this visit.    History of present illness: Amina Wallace is a 68 y.o. female who presented for lab follow-up, med refill, referrals.    Problem   Elevated Fasting Glucose    Recent fasting glucose came back elevated at 106.  No previous history of prediabetes or diabetes.     Alkaline Phosphatase Elevation    Alkaline phosphatase came back elevated at 107.     Elevated Lfts    ALT level came back elevated at 59.  Alkaline phosphatase is also elevated.  She is in the restaurant business, drinks wine some days.     Absence of Bladder Continence    She has urinary incontinence, was seen by gynecology.  She would like to see another provider.  She still has symptoms going on of urinary incontinence.     Chronic Right Shoulder Pain    X-ray right shoulder in November 21 showed There is severe bone-on-bone arthritis of the glenohumeral joints.  The   humeral heads are located within the glenoids.  There is extensive   osteophyte formation around the humeral heads  she is currently on Norco 5-325 milligrams as needed for pain.  Her last refill was last year.  She uses this medication few times in a a month.  She follows with orthopedics and had surgery on her left shoulder.     controlled treatment signed in chart.  She is due for urine drug screen     Htn (Hypertension)    Blood pressure today 112/60.  She is on hydrochlorothiazide 25 mg daily and lisinopril 40 mg daily.  Doing well with this medication.  Recent kidney function, electrolytes came back in normal range.     Hyperlipidemia    She is currently on Lipitor  "80 mg daily and ezetimibe 10 mg daily.  Cholesterol numbers are improving when compared to previous numbers      Lab Results   Component Value Date/Time    CHOLSTRLTOT 210 (H) 09/20/2023 1057    TRIGLYCERIDE 161 (H) 09/20/2023 1057    HDL 88 09/20/2023 1057    LDL 90 09/20/2023 1057               Review of systems:     Review of Systems   Constitutional:  Negative for chills and fever.   Cardiovascular:  Negative for chest pain and palpitations.   Genitourinary:         Urinary incontinence sometimes   Skin:         Skin lesion of back        Medications and Allergies:     Current Outpatient Medications   Medication Sig Dispense Refill    hydroCHLOROthiazide (HYDRODIURIL) 25 MG Tab Take 1 Tablet by mouth every day. 100 Tablet 3    atorvastatin (LIPITOR) 80 MG tablet Take 1 Tablet by mouth every evening. 100 Tablet 3    ezetimibe (ZETIA) 10 MG Tab Take 1 Tablet by mouth every day. 100 Tablet 3    lisinopril (PRINIVIL) 40 MG tablet Take 1 Tablet by mouth every day. 100 Tablet 3    Naloxone (NARCAN) 4 MG/0.1ML Liquid One spray in one nostril for overdose and call 911. 1 Each 0    amoxicillin (AMOXIL) 500 MG Cap TAKE 4 CAPS 1 HOUR PRIOR TO DENTAL CLEANING 4 Capsule 3    Coenzyme Q10 (CO Q 10 PO) Take 1 Capsule by mouth every day.      aspirin (ASA) 81 MG Chew Tab chewable tablet Chew 81 mg every day.      Celestina, Zingiber officinalis, (CELESTINA PO) Take  by mouth.      Ergocalciferol (VITAMIN D2 PO) Take  by mouth.      CRANBERRY EXTRACT PO Take  by mouth.      CINNAMON PO Take  by mouth.      Ferrous Sulfate (IRON PO) Take  by mouth.       No current facility-administered medications for this visit.          Vitals:    /60   Pulse 87   Temp 36.6 °C (97.9 °F)   Resp 16   Ht 1.626 m (5' 4\")   Wt 58 kg (127 lb 13.9 oz)   SpO2 95%  Body mass index is 21.95 kg/m².    Physical Exam:     Physical Exam  Constitutional:       Appearance: Normal appearance. She is well-developed and well-groomed.   HENT:      Head: " Normocephalic and atraumatic.      Right Ear: External ear normal.      Left Ear: External ear normal.   Eyes:      General:         Right eye: No discharge.         Left eye: No discharge.      Conjunctiva/sclera: Conjunctivae normal.   Cardiovascular:      Rate and Rhythm: Normal rate.   Pulmonary:      Effort: Pulmonary effort is normal. No respiratory distress.   Musculoskeletal:      Cervical back: Neck supple.   Skin:     Findings: No rash.   Neurological:      Mental Status: She is alert.   Psychiatric:         Mood and Affect: Mood and affect normal.         Behavior: Behavior normal.          Labs:  I reviewed with patient recent labs resulted on 9/20/2023    Assessment/Plan:         Problem List Items Addressed This Visit       HTN (hypertension)     Chronic problem, stable, continue hydrochlorothiazide and lisinopril at same dose.         Hyperlipidemia     Chronic problem, stable, continue same meds Lipitor and ezetimibe.  Recheck labs before next visit         Relevant Orders    Comp Metabolic Panel    Lipid Profile    Chronic right shoulder pain     Chronic problem, stable, cardiac stimulants.  Discussed with patient that she is due for urine drug screen.  She reports that she had few drinks of alcohol in the last few days.  She does not drink alcohol with this medication.  She uses this medication on an occasional basis.  Recommended patient to not drink alcohol and follow-up next week for urine drug screen.         Absence of bladder continence     Chronic problem, unstable, referral to urology for further evaluation         Relevant Orders    Referral to Urology    Elevated fasting glucose     New problem, mildly elevated glucose level.  Check A1c with next blood test         Relevant Orders    HEMOGLOBIN A1C    Alkaline phosphatase elevation     Chronic problem, unstable, check alkaline phosphatase isoenzymes to check for liver fraction and bone fraction         Relevant Orders    ALKALINE  PHOSPHATASE ISOENZYMES    Elevated LFTs     New problem, unstable, recommend patient to drink alcohol.  Check liver ultrasound and recheck labs in 3 to 4-month         Relevant Orders    US-RUQ     Other Visit Diagnoses       Skin lesion of back  Chronic problem, unstable, was following with dermatology before, she would like to establish with new dermatologist for this.    Relevant Orders    Referral to Dermatology    Encounter for screening mammogram for malignant neoplasm of breast        Relevant Orders    MA-SCREENING MAMMO BILAT W/TOMOSYNTHESIS W/CAD             Please note that this dictation was created using voice recognition software. I have made every reasonable attempt to correct obvious errors, but I expect that there are errors of grammar and possibly content that I did not discover before finalizing the note.    Follow up in 3 to 4-month for lab follow-up.

## 2023-10-24 NOTE — ASSESSMENT & PLAN NOTE
New problem, unstable, recommend patient to drink alcohol.  Check liver ultrasound and recheck labs in 3 to 4-month

## 2023-11-03 ENCOUNTER — NON-PROVIDER VISIT (OUTPATIENT)
Dept: MEDICAL GROUP | Facility: MEDICAL CENTER | Age: 68
End: 2023-11-03
Payer: MEDICARE

## 2023-11-03 DIAGNOSIS — Z51.81 MEDICATION MONITORING ENCOUNTER: ICD-10-CM

## 2023-11-03 NOTE — PROGRESS NOTES
Amina Wallace is a 68 y.o. female here for a non-provider visit for UDS    If abnormal was an in office provider notified today (if so, indicate provider)? No    Routed to PCP? No

## 2023-11-14 ENCOUNTER — TELEPHONE (OUTPATIENT)
Dept: MEDICAL GROUP | Facility: MEDICAL CENTER | Age: 68
End: 2023-11-14
Payer: MEDICARE

## 2023-11-14 NOTE — TELEPHONE ENCOUNTER
Patient's urine drug screen came back positive for marijuana.  This breaks  controlled substance treatment contract.  Marijuana is not recommended to take with Norco medication.  Marijuana stays in the system for 1 month.  We will have to redo urine drug screen for further continuation of medication.  Please let patient know about this.

## 2023-11-30 ENCOUNTER — OFFICE VISIT (OUTPATIENT)
Dept: UROLOGY | Facility: MEDICAL CENTER | Age: 68
End: 2023-11-30
Payer: MEDICARE

## 2023-11-30 VITALS
WEIGHT: 133.3 LBS | BODY MASS INDEX: 22.76 KG/M2 | HEIGHT: 64 IN | HEART RATE: 86 BPM | SYSTOLIC BLOOD PRESSURE: 108 MMHG | DIASTOLIC BLOOD PRESSURE: 71 MMHG | OXYGEN SATURATION: 97 %

## 2023-11-30 DIAGNOSIS — N39.498 OTHER URINARY INCONTINENCE: ICD-10-CM

## 2023-11-30 LAB
POC POST-VOID: 19 ML
POC PRE-VOID: NORMAL

## 2023-11-30 PROCEDURE — 51798 US URINE CAPACITY MEASURE: CPT | Performed by: STUDENT IN AN ORGANIZED HEALTH CARE EDUCATION/TRAINING PROGRAM

## 2023-11-30 PROCEDURE — 3074F SYST BP LT 130 MM HG: CPT | Performed by: STUDENT IN AN ORGANIZED HEALTH CARE EDUCATION/TRAINING PROGRAM

## 2023-11-30 PROCEDURE — 3078F DIAST BP <80 MM HG: CPT | Performed by: STUDENT IN AN ORGANIZED HEALTH CARE EDUCATION/TRAINING PROGRAM

## 2023-11-30 PROCEDURE — 99203 OFFICE O/P NEW LOW 30 MIN: CPT | Performed by: STUDENT IN AN ORGANIZED HEALTH CARE EDUCATION/TRAINING PROGRAM

## 2023-11-30 ASSESSMENT — FIBROSIS 4 INDEX: FIB4 SCORE: 0.49

## 2023-11-30 NOTE — PROGRESS NOTES
Subjective  Amina Wallace is a 68 y.o. female who presents today for evaluation of urinary incontinence.    She first noticed symptoms 8 months ago.     Associated signs/symptoms: urgency, frequency, and urge incontinence    Daytime urinary frequency: q2 hours     Nighttime urinary frequency: q3 hours     She leaks small volumes typically, sometimes once a day, but this is variable. She is only mildly bothered by this. She notices it the most when she gets home and is getting out of the car. She thinks she may have had a UTI recently, but the throbbing sensation went away after approximately one month. She denies a history of recurrent UTI.     Fluid intake: drinks large volumes of water and tea, she understands this contributes to her frequency and nocturia and is not bothered enough by her symptoms to alter her intake.     Smoking status: never    Therapies tried:  none      Family History   Problem Relation Age of Onset    Cancer Mother         cervical    Parkinson's Disease Father     Hypertension Father     Hyperlipidemia Father     No Known Problems Brother        Social History     Socioeconomic History    Marital status: Single     Spouse name: Not on file    Number of children: Not on file    Years of education: Not on file    Highest education level: Not on file   Occupational History    Not on file   Tobacco Use    Smoking status: Never    Smokeless tobacco: Never   Vaping Use    Vaping Use: Never used   Substance and Sexual Activity    Alcohol use: Yes     Comment: 1 per day    Drug use: Yes     Comment: liquid CBD    Sexual activity: Yes     Partners: Male   Other Topics Concern    Not on file   Social History Narrative    Not on file     Social Determinants of Health     Financial Resource Strain: Not on file   Food Insecurity: Not on file   Transportation Needs: Not on file   Physical Activity: Not on file   Stress: Not on file   Social Connections: Not on file   Intimate Partner Violence: Not on  "file   Housing Stability: Not on file       Past Surgical History:   Procedure Laterality Date    PB RECONSTR TOTAL SHOULDER IMPLANT Left 10/19/2022    Procedure: LEFT ANATOMIC TOTAL SHOULDER ARTHROPLASTY;  Surgeon: Domingo Santamaria M.D.;  Location: SURGERY SAME DAY [facility];  Service: Orthopedics    KNEE ARTHROPLASTY TOTAL         Past Medical History:   Diagnosis Date    Cataract 2019    bilat    High cholesterol     Hyperlipidemia 03/19/2012    Hypertension        Current Outpatient Medications   Medication Sig Dispense Refill    hydroCHLOROthiazide (HYDRODIURIL) 25 MG Tab Take 1 Tablet by mouth every day. 100 Tablet 3    atorvastatin (LIPITOR) 80 MG tablet Take 1 Tablet by mouth every evening. 100 Tablet 3    ezetimibe (ZETIA) 10 MG Tab Take 1 Tablet by mouth every day. 100 Tablet 3    lisinopril (PRINIVIL) 40 MG tablet Take 1 Tablet by mouth every day. 100 Tablet 3    Naloxone (NARCAN) 4 MG/0.1ML Liquid One spray in one nostril for overdose and call 911. 1 Each 0    Coenzyme Q10 (CO Q 10 PO) Take 1 Capsule by mouth every day.      aspirin (ASA) 81 MG Chew Tab chewable tablet Chew 81 mg every day.      Celestina, Zingiber officinalis, (CELESTINA PO) Take  by mouth.      Ergocalciferol (VITAMIN D2 PO) Take  by mouth.      CRANBERRY EXTRACT PO Take  by mouth.      CINNAMON PO Take  by mouth.      Ferrous Sulfate (IRON PO) Take  by mouth.      amoxicillin (AMOXIL) 500 MG Cap TAKE 4 CAPS 1 HOUR PRIOR TO DENTAL CLEANING (Patient not taking: Reported on 11/30/2023) 4 Capsule 3     No current facility-administered medications for this visit.       Allergies   Allergen Reactions    Penicillins      Rxn as child       Objective  /71 (BP Location: Left arm, Patient Position: Sitting, BP Cuff Size: Adult)   Pulse 86   Ht 1.626 m (5' 4\")   Wt 60.5 kg (133 lb 4.8 oz)   SpO2 97%     PVR 19 cc    Physical Exam  Constitutional:       Appearance: Normal appearance.   HENT:      Head: Normocephalic and atraumatic. "   Eyes:      Pupils: Pupils are equal, round, and reactive to light.   Pulmonary:      Effort: No respiratory distress.   Skin:     General: Skin is warm and dry.   Neurological:      General: No focal deficit present.      Mental Status: She is alert.   Psychiatric:         Mood and Affect: Mood normal.         Behavior: Behavior normal.         Labs:   CBC   Lab Results   Component Value Date/Time    WBC 10.0 10/06/2022 1252    RBC 4.33 10/06/2022 1252    HEMOGLOBIN 14.1 10/06/2022 1252    HEMATOCRIT 41.4 10/06/2022 1252    MCV 95.6 10/06/2022 1252    MCH 32.6 10/06/2022 1252    MCHC 34.1 10/06/2022 1252    RDW 43.9 10/06/2022 1252    MPV 9.0 10/06/2022 1252    LYMPHOCYTES 26.70 10/06/2022 1252    LYMPHS 2.68 10/06/2022 1252    MONOCYTES 7.30 10/06/2022 1252    MONOS 0.73 10/06/2022 1252    EOSINOPHILS 2.20 10/06/2022 1252    EOS 0.22 10/06/2022 1252    BASOPHILS 0.90 10/06/2022 1252    BASO 0.09 10/06/2022 1252    NRBC 0.00 10/06/2022 1252     BMP   Lab Results   Component Value Date/Time    SODIUM 138 09/20/2023 1057    POTASSIUM 4.3 09/20/2023 1057    CHLORIDE 102 09/20/2023 1057    CO2 23 09/20/2023 1057    GLUCOSE 106 (H) 09/20/2023 1057    BUN 20 09/20/2023 1057    CREATININE 0.65 09/20/2023 1057    CALCIUM 9.9 09/20/2023 1057         Imaging:     none    Assessment    I discussed with the patient that given she is not bothered by her current symptoms  it would be optional to initiate any therapies.  We could consider referral to pelvic floor physical therapy versus a trial of medication to address her overactivity and urge incontinence.  As she has experienced a UTI and likely has some component of vaginal atrophy we could also consider starting vaginal estrogen cream. We discussed the risks and benefits of this therapy, explained that it could improve her OAB/UI symptoms and prevent UTI. She would like to hold on any therapy at this time.    Plan    Problem List Items Addressed This Visit       Absence of  bladder continence    Relevant Orders    POCT BLADDER SCAN (Completed)

## 2024-01-11 ENCOUNTER — HOSPITAL ENCOUNTER (OUTPATIENT)
Dept: LAB | Facility: MEDICAL CENTER | Age: 69
End: 2024-01-11
Attending: INTERNAL MEDICINE
Payer: MEDICARE

## 2024-01-11 DIAGNOSIS — R74.8 ALKALINE PHOSPHATASE ELEVATION: ICD-10-CM

## 2024-01-11 DIAGNOSIS — R73.01 ELEVATED FASTING GLUCOSE: ICD-10-CM

## 2024-01-11 DIAGNOSIS — E78.2 MIXED HYPERLIPIDEMIA: ICD-10-CM

## 2024-01-11 LAB
ALBUMIN SERPL BCP-MCNC: 4.9 G/DL (ref 3.2–4.9)
ALBUMIN/GLOB SERPL: 1.6 G/DL
ALP SERPL-CCNC: 93 U/L (ref 30–99)
ALT SERPL-CCNC: 48 U/L (ref 2–50)
ANION GAP SERPL CALC-SCNC: 16 MMOL/L (ref 7–16)
AST SERPL-CCNC: 23 U/L (ref 12–45)
BILIRUB SERPL-MCNC: 0.7 MG/DL (ref 0.1–1.5)
BUN SERPL-MCNC: 24 MG/DL (ref 8–22)
CALCIUM ALBUM COR SERPL-MCNC: 9.5 MG/DL (ref 8.5–10.5)
CALCIUM SERPL-MCNC: 10.2 MG/DL (ref 8.5–10.5)
CHLORIDE SERPL-SCNC: 103 MMOL/L (ref 96–112)
CHOLEST SERPL-MCNC: 225 MG/DL (ref 100–199)
CO2 SERPL-SCNC: 21 MMOL/L (ref 20–33)
CREAT SERPL-MCNC: 0.69 MG/DL (ref 0.5–1.4)
EST. AVERAGE GLUCOSE BLD GHB EST-MCNC: 114 MG/DL
FASTING STATUS PATIENT QL REPORTED: NORMAL
GFR SERPLBLD CREATININE-BSD FMLA CKD-EPI: 94 ML/MIN/1.73 M 2
GLOBULIN SER CALC-MCNC: 3.1 G/DL (ref 1.9–3.5)
GLUCOSE SERPL-MCNC: 99 MG/DL (ref 65–99)
HBA1C MFR BLD: 5.6 % (ref 4–5.6)
HDLC SERPL-MCNC: 86 MG/DL
LDLC SERPL CALC-MCNC: 96 MG/DL
POTASSIUM SERPL-SCNC: 4.3 MMOL/L (ref 3.6–5.5)
PROT SERPL-MCNC: 8 G/DL (ref 6–8.2)
SODIUM SERPL-SCNC: 140 MMOL/L (ref 135–145)
TRIGL SERPL-MCNC: 217 MG/DL (ref 0–149)

## 2024-01-11 PROCEDURE — 84080 ASSAY ALKALINE PHOSPHATASES: CPT

## 2024-01-11 PROCEDURE — 84075 ASSAY ALKALINE PHOSPHATASE: CPT

## 2024-01-11 PROCEDURE — 80053 COMPREHEN METABOLIC PANEL: CPT

## 2024-01-11 PROCEDURE — 36415 COLL VENOUS BLD VENIPUNCTURE: CPT

## 2024-01-11 PROCEDURE — 83036 HEMOGLOBIN GLYCOSYLATED A1C: CPT

## 2024-01-11 PROCEDURE — 80061 LIPID PANEL: CPT

## 2024-01-15 LAB
ALP BONE SERPL-CCNC: 32 U/L (ref 0–55)
ALP ISOS SERPL HS-CCNC: 0 U/L
ALP LIVER SERPL-CCNC: 64 U/L (ref 0–94)
ALP SERPL-CCNC: 96 U/L (ref 40–120)

## 2024-01-18 ENCOUNTER — HOSPITAL ENCOUNTER (OUTPATIENT)
Dept: RADIOLOGY | Facility: MEDICAL CENTER | Age: 69
End: 2024-01-18
Attending: FAMILY MEDICINE
Payer: MEDICARE

## 2024-01-18 ENCOUNTER — TELEPHONE (OUTPATIENT)
Dept: MEDICAL GROUP | Facility: MEDICAL CENTER | Age: 69
End: 2024-01-18
Payer: MEDICARE

## 2024-01-18 DIAGNOSIS — Z12.31 ENCOUNTER FOR SCREENING MAMMOGRAM FOR MALIGNANT NEOPLASM OF BREAST: ICD-10-CM

## 2024-01-18 DIAGNOSIS — R92.8 ABNORMAL MAMMOGRAM OF LEFT BREAST: ICD-10-CM

## 2024-01-18 PROCEDURE — 77067 SCR MAMMO BI INCL CAD: CPT

## 2024-01-19 NOTE — TELEPHONE ENCOUNTER
----- Message from Nikolay Mauro M.D. sent at 1/18/2024  3:22 PM PST -----  I called patient to discuss about these results and left her voicemail.  I also sent her a [address] message regarding this.  Please call patient if she does not see [employer] message in the next 24 hours.  Her mammogram came back abnormal, showed left breast asymmetry for which further diagnostic mammogram and ultrasound is recommended.  I ordered both these test.

## 2024-01-24 ENCOUNTER — OFFICE VISIT (OUTPATIENT)
Dept: MEDICAL GROUP | Facility: MEDICAL CENTER | Age: 69
End: 2024-01-24
Payer: MEDICARE

## 2024-01-24 ENCOUNTER — HOSPITAL ENCOUNTER (OUTPATIENT)
Facility: MEDICAL CENTER | Age: 69
End: 2024-01-24
Attending: FAMILY MEDICINE
Payer: MEDICARE

## 2024-01-24 VITALS
HEIGHT: 64 IN | TEMPERATURE: 97.8 F | HEART RATE: 83 BPM | WEIGHT: 134.48 LBS | SYSTOLIC BLOOD PRESSURE: 110 MMHG | RESPIRATION RATE: 16 BRPM | DIASTOLIC BLOOD PRESSURE: 62 MMHG | BODY MASS INDEX: 22.96 KG/M2 | OXYGEN SATURATION: 95 %

## 2024-01-24 DIAGNOSIS — Z51.81 MEDICATION MONITORING ENCOUNTER: ICD-10-CM

## 2024-01-24 DIAGNOSIS — E78.2 MIXED HYPERLIPIDEMIA: ICD-10-CM

## 2024-01-24 DIAGNOSIS — M25.511 CHRONIC RIGHT SHOULDER PAIN: ICD-10-CM

## 2024-01-24 DIAGNOSIS — G89.29 CHRONIC RIGHT SHOULDER PAIN: ICD-10-CM

## 2024-01-24 PROCEDURE — 99214 OFFICE O/P EST MOD 30 MIN: CPT | Performed by: FAMILY MEDICINE

## 2024-01-24 PROCEDURE — 80307 DRUG TEST PRSMV CHEM ANLYZR: CPT

## 2024-01-24 PROCEDURE — 3074F SYST BP LT 130 MM HG: CPT | Performed by: FAMILY MEDICINE

## 2024-01-24 PROCEDURE — 3078F DIAST BP <80 MM HG: CPT | Performed by: FAMILY MEDICINE

## 2024-01-24 RX ORDER — ROSUVASTATIN CALCIUM 40 MG/1
40 TABLET, COATED ORAL DAILY
Qty: 90 TABLET | Refills: 3 | Status: SHIPPED | OUTPATIENT
Start: 2024-01-24

## 2024-01-24 ASSESSMENT — FIBROSIS 4 INDEX: FIB4 SCORE: 0.54

## 2024-01-24 ASSESSMENT — ENCOUNTER SYMPTOMS
CHILLS: 0
PALPITATIONS: 0
FEVER: 0

## 2024-01-24 ASSESSMENT — PATIENT HEALTH QUESTIONNAIRE - PHQ9: CLINICAL INTERPRETATION OF PHQ2 SCORE: 0

## 2024-01-24 NOTE — PROGRESS NOTES
FAMILY MEDICINE VISIT                                                               Chief complaint::Diagnoses of Mixed hyperlipidemia, Chronic right shoulder pain, and Medication monitoring encounter were pertinent to this visit.    History of present illness: Amina Wallace is a 68 y.o. female who presented for lab follow up.    Problem   Chronic Right Shoulder Pain    X-ray right shoulder in November 21 showed There is severe bone-on-bone arthritis of the glenohumeral joints.  The   humeral heads are located within the glenoids.  There is extensive   osteophyte formation around the humeral heads  she is currently on Norco 5-325 milligrams as needed for pain.  She uses this medication few times in a a month.  She follows with orthopedics and had surgery on her left shoulder.     controlled treatment signed in chart.    Last urine drug screen came back positive for marijuana.  She was using spray for pain which had THC which she was not aware of.  She stopped using that cream now for a month.  She will do her urine drug screen again     Hyperlipidemia    She is currently on Lipitor 80 mg daily and ezetimibe 10 mg daily.  Cholesterol numbers got worse when compared to previous numbers      Lab Results   Component Value Date/Time    CHOLSTRLTOT 225 (H) 01/11/2024 1134    TRIGLYCERIDE 217 (H) 01/11/2024 1134    HDL 86 01/11/2024 1134    LDL 96 01/11/2024 1134                Review of systems:     Review of Systems   Constitutional:  Negative for chills and fever.   Cardiovascular:  Negative for chest pain, palpitations and leg swelling.   Musculoskeletal:  Positive for joint pain.        Medications and Allergies:     Current Outpatient Medications   Medication Sig Dispense Refill    rosuvastatin (CRESTOR) 40 MG tablet Take 1 Tablet by mouth every day. 90 Tablet 3    hydroCHLOROthiazide (HYDRODIURIL) 25 MG Tab Take 1 Tablet by mouth every day. 100 Tablet 3    ezetimibe (ZETIA) 10 MG Tab Take 1 Tablet by mouth every  "day. 100 Tablet 3    lisinopril (PRINIVIL) 40 MG tablet Take 1 Tablet by mouth every day. 100 Tablet 3    Naloxone (NARCAN) 4 MG/0.1ML Liquid One spray in one nostril for overdose and call 911. 1 Each 0    amoxicillin (AMOXIL) 500 MG Cap TAKE 4 CAPS 1 HOUR PRIOR TO DENTAL CLEANING 4 Capsule 3    Coenzyme Q10 (CO Q 10 PO) Take 1 Capsule by mouth every day.      aspirin (ASA) 81 MG Chew Tab chewable tablet Chew 81 mg every day.      Celestina, Zingiber officinalis, (CELESTINA PO) Take  by mouth.      Ergocalciferol (VITAMIN D2 PO) Take  by mouth.      CRANBERRY EXTRACT PO Take  by mouth.      CINNAMON PO Take  by mouth.      Ferrous Sulfate (IRON PO) Take  by mouth.       No current facility-administered medications for this visit.          Vitals:    /62   Pulse 83   Temp 36.6 °C (97.8 °F)   Resp 16   Ht 1.626 m (5' 4\")   Wt 61 kg (134 lb 7.7 oz)   SpO2 95%  Body mass index is 23.08 kg/m².    Physical Exam:     Physical Exam  Constitutional:       Appearance: Normal appearance. She is well-developed and well-groomed.   HENT:      Head: Normocephalic and atraumatic.      Right Ear: External ear normal.      Left Ear: External ear normal.   Eyes:      General:         Right eye: No discharge.         Left eye: No discharge.      Conjunctiva/sclera: Conjunctivae normal.   Cardiovascular:      Rate and Rhythm: Normal rate.   Pulmonary:      Effort: Pulmonary effort is normal. No respiratory distress.   Musculoskeletal:      Cervical back: Neck supple.   Skin:     Findings: No rash.   Neurological:      Mental Status: She is alert.   Psychiatric:         Mood and Affect: Mood and affect normal.         Behavior: Behavior normal.          Labs:  I reviewed with patient recent labs resulted on 1/11/2024.    Assessment/Plan:         Problem List Items Addressed This Visit       Hyperlipidemia     Chronic problem, unstable, stop Lipitor and start Crestor 40 mg daily, continue ezetimibe 10 mg daily.  Recheck labs in " 6-month.         Relevant Medications    rosuvastatin (CRESTOR) 40 MG tablet    Other Relevant Orders    Comp Metabolic Panel    Lipid Profile    Chronic right shoulder pain     Chronic problem, stable, urine drug screen sample collected today if UDS is consistent with prescription and negative for any other substances then will prescribe medication.  Discussed with patient that if this urine drug screen came back positive for any other substances, we will not be able to prescribe medication to her.  Patient reports understanding of this.          Other Visit Diagnoses       Medication monitoring encounter        Relevant Orders    URINE DRUG SCREEN W/CONF (AR)             Please note that this dictation was created using voice recognition software. I have made every reasonable attempt to correct obvious errors, but I expect that there are errors of grammar and possibly content that I did not discover before finalizing the note.    Follow up in 6 months for lab follow up.

## 2024-01-24 NOTE — ASSESSMENT & PLAN NOTE
Chronic problem, unstable, stop Lipitor and start Crestor 40 mg daily, continue ezetimibe 10 mg daily.  Recheck labs in 6-month.

## 2024-01-24 NOTE — ASSESSMENT & PLAN NOTE
Chronic problem, stable, urine drug screen sample collected today if UDS is consistent with prescription and negative for any other substances then will prescribe medication.  Discussed with patient that if this urine drug screen came back positive for any other substances, we will not be able to prescribe medication to her.  Patient reports understanding of this.

## 2024-01-25 ENCOUNTER — APPOINTMENT (OUTPATIENT)
Dept: RADIOLOGY | Facility: MEDICAL CENTER | Age: 69
End: 2024-01-25
Attending: FAMILY MEDICINE
Payer: MEDICARE

## 2024-01-26 DIAGNOSIS — G89.29 CHRONIC RIGHT SHOULDER PAIN: ICD-10-CM

## 2024-01-26 DIAGNOSIS — M25.511 CHRONIC RIGHT SHOULDER PAIN: ICD-10-CM

## 2024-01-26 LAB
AMPHET CTO UR CFM-MCNC: NEGATIVE NG/ML
BARBITURATES CTO UR CFM-MCNC: NEGATIVE NG/ML
BENZODIAZ CTO UR CFM-MCNC: NEGATIVE NG/ML
CANNABINOIDS CTO UR CFM-MCNC: NEGATIVE NG/ML
COCAINE CTO UR CFM-MCNC: NEGATIVE NG/ML
CREAT UR-MCNC: 57.7 MG/DL (ref 20–400)
DRUG COMMENT 753798: NORMAL
METHADONE CTO UR CFM-MCNC: NEGATIVE NG/ML
OPIATES CTO UR CFM-MCNC: NEGATIVE NG/ML
PCP CTO UR CFM-MCNC: NEGATIVE NG/ML
PROPOXYPH CTO UR CFM-MCNC: NEGATIVE NG/ML

## 2024-01-26 RX ORDER — HYDROCODONE BITARTRATE AND ACETAMINOPHEN 5; 325 MG/1; MG/1
1 TABLET ORAL
Qty: 30 TABLET | Refills: 0 | Status: SHIPPED | OUTPATIENT
Start: 2024-01-26 | End: 2024-02-25

## 2024-01-31 ENCOUNTER — HOSPITAL ENCOUNTER (OUTPATIENT)
Dept: RADIOLOGY | Facility: MEDICAL CENTER | Age: 69
End: 2024-01-31
Attending: FAMILY MEDICINE
Payer: MEDICARE

## 2024-01-31 DIAGNOSIS — R92.8 ABNORMAL MAMMOGRAM OF LEFT BREAST: ICD-10-CM

## 2024-01-31 PROCEDURE — 76642 ULTRASOUND BREAST LIMITED: CPT | Mod: LT

## 2024-01-31 PROCEDURE — G0279 TOMOSYNTHESIS, MAMMO: HCPCS

## 2024-02-14 ENCOUNTER — HOSPITAL ENCOUNTER (OUTPATIENT)
Dept: RADIOLOGY | Facility: MEDICAL CENTER | Age: 69
End: 2024-02-14
Attending: FAMILY MEDICINE
Payer: MEDICARE

## 2024-02-14 DIAGNOSIS — R92.8 ABNORMAL FINDINGS ON DIAGNOSTIC IMAGING OF BREAST: ICD-10-CM

## 2024-02-14 LAB — PATHOLOGY CONSULT NOTE: NORMAL

## 2024-02-14 PROCEDURE — 88305 TISSUE EXAM BY PATHOLOGIST: CPT

## 2024-02-14 PROCEDURE — 77065 DX MAMMO INCL CAD UNI: CPT | Mod: LT

## 2024-02-15 ENCOUNTER — TELEPHONE (OUTPATIENT)
Dept: RADIOLOGY | Facility: MEDICAL CENTER | Age: 69
End: 2024-02-15
Payer: MEDICARE

## 2024-02-15 DIAGNOSIS — R92.8 ABNORMAL MAMMOGRAM OF LEFT BREAST: ICD-10-CM

## 2024-07-11 ENCOUNTER — HOSPITAL ENCOUNTER (OUTPATIENT)
Dept: LAB | Facility: MEDICAL CENTER | Age: 69
End: 2024-07-11
Attending: FAMILY MEDICINE
Payer: MEDICARE

## 2024-07-11 DIAGNOSIS — E78.2 MIXED HYPERLIPIDEMIA: ICD-10-CM

## 2024-07-11 LAB
ALBUMIN SERPL BCP-MCNC: 4.7 G/DL (ref 3.2–4.9)
ALBUMIN/GLOB SERPL: 1.6 G/DL
ALP SERPL-CCNC: 101 U/L (ref 30–99)
ALT SERPL-CCNC: 58 U/L (ref 2–50)
ANION GAP SERPL CALC-SCNC: 17 MMOL/L (ref 7–16)
AST SERPL-CCNC: 22 U/L (ref 12–45)
BILIRUB SERPL-MCNC: 0.6 MG/DL (ref 0.1–1.5)
BUN SERPL-MCNC: 18 MG/DL (ref 8–22)
CALCIUM ALBUM COR SERPL-MCNC: 9.7 MG/DL (ref 8.5–10.5)
CALCIUM SERPL-MCNC: 10.3 MG/DL (ref 8.5–10.5)
CHLORIDE SERPL-SCNC: 104 MMOL/L (ref 96–112)
CHOLEST SERPL-MCNC: 210 MG/DL (ref 100–199)
CO2 SERPL-SCNC: 21 MMOL/L (ref 20–33)
CREAT SERPL-MCNC: 0.71 MG/DL (ref 0.5–1.4)
FASTING STATUS PATIENT QL REPORTED: NORMAL
GFR SERPLBLD CREATININE-BSD FMLA CKD-EPI: 92 ML/MIN/1.73 M 2
GLOBULIN SER CALC-MCNC: 2.9 G/DL (ref 1.9–3.5)
GLUCOSE SERPL-MCNC: 99 MG/DL (ref 65–99)
HDLC SERPL-MCNC: 74 MG/DL
LDLC SERPL CALC-MCNC: 100 MG/DL
POTASSIUM SERPL-SCNC: 4.1 MMOL/L (ref 3.6–5.5)
PROT SERPL-MCNC: 7.6 G/DL (ref 6–8.2)
SODIUM SERPL-SCNC: 142 MMOL/L (ref 135–145)
TRIGL SERPL-MCNC: 182 MG/DL (ref 0–149)

## 2024-07-11 PROCEDURE — 36415 COLL VENOUS BLD VENIPUNCTURE: CPT

## 2024-07-11 PROCEDURE — 80061 LIPID PANEL: CPT

## 2024-07-11 PROCEDURE — 80053 COMPREHEN METABOLIC PANEL: CPT

## 2024-07-15 PROBLEM — M17.9 OSTEOARTHRITIS, KNEE: Status: ACTIVE | Noted: 2024-07-15

## 2024-07-24 ENCOUNTER — OFFICE VISIT (OUTPATIENT)
Dept: MEDICAL GROUP | Facility: MEDICAL CENTER | Age: 69
End: 2024-07-24
Payer: MEDICARE

## 2024-07-24 VITALS
WEIGHT: 131 LBS | DIASTOLIC BLOOD PRESSURE: 72 MMHG | HEIGHT: 64 IN | BODY MASS INDEX: 22.36 KG/M2 | HEART RATE: 85 BPM | SYSTOLIC BLOOD PRESSURE: 118 MMHG | OXYGEN SATURATION: 97 % | TEMPERATURE: 98.2 F

## 2024-07-24 DIAGNOSIS — R13.11 ORAL PHASE DYSPHAGIA: ICD-10-CM

## 2024-07-24 DIAGNOSIS — R79.89 ELEVATED LFTS: ICD-10-CM

## 2024-07-24 DIAGNOSIS — E78.2 MIXED HYPERLIPIDEMIA: ICD-10-CM

## 2024-07-24 DIAGNOSIS — M25.511 CHRONIC RIGHT SHOULDER PAIN: ICD-10-CM

## 2024-07-24 DIAGNOSIS — M79.675 PAIN OF TOE OF LEFT FOOT: ICD-10-CM

## 2024-07-24 DIAGNOSIS — G89.29 CHRONIC RIGHT SHOULDER PAIN: ICD-10-CM

## 2024-07-24 PROCEDURE — 3078F DIAST BP <80 MM HG: CPT | Performed by: FAMILY MEDICINE

## 2024-07-24 PROCEDURE — 99214 OFFICE O/P EST MOD 30 MIN: CPT | Performed by: FAMILY MEDICINE

## 2024-07-24 PROCEDURE — 3074F SYST BP LT 130 MM HG: CPT | Performed by: FAMILY MEDICINE

## 2024-07-24 RX ORDER — HYDROCODONE BITARTRATE AND ACETAMINOPHEN 5; 325 MG/1; MG/1
1 TABLET ORAL
Qty: 30 TABLET | Refills: 0 | Status: SHIPPED | OUTPATIENT
Start: 2024-07-24 | End: 2024-08-23

## 2024-07-24 RX ORDER — ROSUVASTATIN CALCIUM 40 MG/1
40 TABLET, COATED ORAL DAILY
Qty: 90 TABLET | Refills: 3 | Status: SHIPPED | OUTPATIENT
Start: 2024-07-24

## 2024-07-24 ASSESSMENT — ENCOUNTER SYMPTOMS
ROS GI COMMENTS: DIFFICULTY SWALLOWING.
FEVER: 0
CHILLS: 0

## 2024-07-24 ASSESSMENT — FIBROSIS 4 INDEX: FIB4 SCORE: 0.47

## 2024-07-25 PROBLEM — M17.12 PRIMARY OSTEOARTHRITIS OF LEFT KNEE: Status: ACTIVE | Noted: 2024-07-15

## 2024-08-09 ENCOUNTER — HOSPITAL ENCOUNTER (OUTPATIENT)
Dept: RADIOLOGY | Facility: MEDICAL CENTER | Age: 69
End: 2024-08-09
Attending: ORTHOPAEDIC SURGERY
Payer: MEDICARE

## 2024-08-09 DIAGNOSIS — M17.12 PRIMARY OSTEOARTHRITIS OF LEFT KNEE: ICD-10-CM

## 2024-08-09 PROCEDURE — 73700 CT LOWER EXTREMITY W/O DYE: CPT | Mod: LT

## 2024-08-15 ENCOUNTER — HOSPITAL ENCOUNTER (OUTPATIENT)
Dept: RADIOLOGY | Facility: MEDICAL CENTER | Age: 69
End: 2024-08-15
Attending: FAMILY MEDICINE
Payer: MEDICARE

## 2024-08-15 DIAGNOSIS — R92.8 ABNORMAL MAMMOGRAM OF LEFT BREAST: ICD-10-CM

## 2024-08-15 PROCEDURE — G0279 TOMOSYNTHESIS, MAMMO: HCPCS | Mod: LT

## 2024-08-28 PROBLEM — M19.90 ARTHRITIS: Status: ACTIVE | Noted: 2024-08-28

## 2024-10-20 DIAGNOSIS — E78.2 MIXED HYPERLIPIDEMIA: ICD-10-CM

## 2024-10-20 DIAGNOSIS — I10 PRIMARY HYPERTENSION: ICD-10-CM

## 2024-10-21 RX ORDER — HYDROCHLOROTHIAZIDE 25 MG/1
25 TABLET ORAL DAILY
Qty: 100 TABLET | Refills: 3 | Status: SHIPPED | OUTPATIENT
Start: 2024-10-21

## 2024-10-21 RX ORDER — EZETIMIBE 10 MG/1
10 TABLET ORAL DAILY
Qty: 100 TABLET | Refills: 3 | Status: SHIPPED | OUTPATIENT
Start: 2024-10-21

## 2024-10-21 RX ORDER — LISINOPRIL 40 MG/1
40 TABLET ORAL DAILY
Qty: 100 TABLET | Refills: 3 | Status: SHIPPED | OUTPATIENT
Start: 2024-10-21

## 2024-10-21 RX ORDER — ROSUVASTATIN CALCIUM 40 MG/1
40 TABLET, COATED ORAL DAILY
Qty: 100 TABLET | Refills: 3 | Status: SHIPPED | OUTPATIENT
Start: 2024-10-21

## 2024-10-31 ENCOUNTER — TELEPHONE (OUTPATIENT)
Dept: HEALTH INFORMATION MANAGEMENT | Facility: OTHER | Age: 69
End: 2024-10-31

## 2024-11-19 ENCOUNTER — HOSPITAL ENCOUNTER (OUTPATIENT)
Dept: LAB | Facility: MEDICAL CENTER | Age: 69
End: 2024-11-19
Attending: FAMILY MEDICINE
Payer: MEDICARE

## 2024-11-19 DIAGNOSIS — E78.2 MIXED HYPERLIPIDEMIA: ICD-10-CM

## 2024-11-19 DIAGNOSIS — M79.675 PAIN OF TOE OF LEFT FOOT: ICD-10-CM

## 2024-11-19 DIAGNOSIS — R79.89 ELEVATED LFTS: ICD-10-CM

## 2024-11-19 LAB
ALBUMIN SERPL BCP-MCNC: 4.7 G/DL (ref 3.2–4.9)
ALBUMIN/GLOB SERPL: 1.3 G/DL
ALP SERPL-CCNC: 106 U/L (ref 30–99)
ALT SERPL-CCNC: 37 U/L (ref 2–50)
ANION GAP SERPL CALC-SCNC: 13 MMOL/L (ref 7–16)
AST SERPL-CCNC: 22 U/L (ref 12–45)
BILIRUB SERPL-MCNC: 0.4 MG/DL (ref 0.1–1.5)
BUN SERPL-MCNC: 27 MG/DL (ref 8–22)
CALCIUM ALBUM COR SERPL-MCNC: 9.7 MG/DL (ref 8.5–10.5)
CALCIUM SERPL-MCNC: 10.3 MG/DL (ref 8.5–10.5)
CHLORIDE SERPL-SCNC: 101 MMOL/L (ref 96–112)
CHOLEST SERPL-MCNC: 198 MG/DL (ref 100–199)
CO2 SERPL-SCNC: 22 MMOL/L (ref 20–33)
CREAT SERPL-MCNC: 0.84 MG/DL (ref 0.5–1.4)
GFR SERPLBLD CREATININE-BSD FMLA CKD-EPI: 75 ML/MIN/1.73 M 2
GLOBULIN SER CALC-MCNC: 3.6 G/DL (ref 1.9–3.5)
GLUCOSE SERPL-MCNC: 101 MG/DL (ref 65–99)
HDLC SERPL-MCNC: 77 MG/DL
LDLC SERPL CALC-MCNC: 88 MG/DL
POTASSIUM SERPL-SCNC: 4 MMOL/L (ref 3.6–5.5)
PROT SERPL-MCNC: 8.3 G/DL (ref 6–8.2)
SODIUM SERPL-SCNC: 136 MMOL/L (ref 135–145)
TRIGL SERPL-MCNC: 165 MG/DL (ref 0–149)
URATE SERPL-MCNC: 8 MG/DL (ref 1.9–8.2)

## 2024-11-19 PROCEDURE — 80061 LIPID PANEL: CPT

## 2024-11-19 PROCEDURE — 36415 COLL VENOUS BLD VENIPUNCTURE: CPT

## 2024-11-19 PROCEDURE — 84550 ASSAY OF BLOOD/URIC ACID: CPT

## 2024-11-19 PROCEDURE — 80053 COMPREHEN METABOLIC PANEL: CPT

## 2024-11-27 ENCOUNTER — OFFICE VISIT (OUTPATIENT)
Dept: MEDICAL GROUP | Facility: MEDICAL CENTER | Age: 69
End: 2024-11-27
Payer: MEDICARE

## 2024-11-27 VITALS
SYSTOLIC BLOOD PRESSURE: 126 MMHG | TEMPERATURE: 98.1 F | DIASTOLIC BLOOD PRESSURE: 78 MMHG | WEIGHT: 131 LBS | OXYGEN SATURATION: 98 % | HEIGHT: 64 IN | HEART RATE: 78 BPM | BODY MASS INDEX: 22.36 KG/M2

## 2024-11-27 DIAGNOSIS — M79.675 PAIN OF TOE OF LEFT FOOT: ICD-10-CM

## 2024-11-27 DIAGNOSIS — R73.01 ELEVATED FASTING GLUCOSE: ICD-10-CM

## 2024-11-27 DIAGNOSIS — R79.89 ELEVATED LFTS: ICD-10-CM

## 2024-11-27 DIAGNOSIS — Z23 NEED FOR VACCINATION: ICD-10-CM

## 2024-11-27 DIAGNOSIS — E78.2 MIXED HYPERLIPIDEMIA: ICD-10-CM

## 2024-11-27 DIAGNOSIS — R94.4 DECREASED GFR: ICD-10-CM

## 2024-11-27 ASSESSMENT — ENCOUNTER SYMPTOMS
PALPITATIONS: 0
FEVER: 0
CHILLS: 0

## 2024-11-27 NOTE — PROGRESS NOTES
Verbal consent was acquired by the patient to use [employer] ambient listening note generation during this visit.      Amina was seen today for follow-up.    Diagnoses and all orders for this visit:    Pain of toe of left foot  -     URIC ACID; Future    Elevated LFTs    Elevated fasting glucose  -     HEMOGLOBIN A1C; Future    Mixed hyperlipidemia  -     Comp Metabolic Panel; Future  -     Lipid Profile; Future    Need for vaccination  -     INFLUENZA VACCINE, HIGH DOSE (65+ ONLY)                  Assessment & Plan  1. Decreased glomerular filtration rate  - New condition stable  - Kidney function test shows a decrease in filtration rate from 90% to 75%  - Advised to increase water intake  - Hydrochlorothiazide may be affecting kidney function  - Monitor kidney function with a follow-up lab test in 6 months      2. Hyperlipidemia:  - Chronic condition, unstable, although significantly improved.  Continue ezetimibe 10 mg daily and rosuvastatin 40-minute daily.      4. Borderline elevated uric acid and pain of left toe:  -Chronic condition, stable,  - Uric acid level slightly elevated at 8.0  - No current symptoms of gout  - Continue monitoring uric acid levels with a follow-up lab test in 6 months    5.  Elevated LFT  - Chronic condition, stable, improving  -Recheck labs in 6-month.      6.  Elevated fasting glucose  - Chronic condition, stable, check A1c with next blood test     Health Maintenance: Flu vaccine given today    Follow-up:  - Return in 3 months for medication review  - Return in 6 months for blood work          Chief complaint::Diagnoses of Pain of toe of left foot, Elevated LFTs, Elevated fasting glucose, Mixed hyperlipidemia, and Need for vaccination were pertinent to this visit.      History of Present Illness  The patient is a 69-year-old female who presents for a blood test.    Knee Replacement Surgery  She underwent knee replacement surgery in August 2024 and is currently on pain medication for  "her shoulder.  - Onset: August 2024.  - Location: Knee.  - Character: Post-surgical pain.  - Alleviating/Aggravating Factors: Pain medication.    Shoulder Pain  She is currently on pain medication Norco for her shoulder.  - Character: Pain in the shoulder.  - Alleviating/Aggravating Factors: Pain medication.    Water Consumption and Weight  She has been consuming a significant amount of water.           Review of Systems   Constitutional:  Negative for chills and fever.   Cardiovascular:  Negative for chest pain, palpitations and leg swelling.          Medications and Allergies:     Current Outpatient Medications   Medication Sig Dispense Refill    hydroCHLOROthiazide 25 MG Tab Take 1 tablet by mouth once daily 100 Tablet 3    rosuvastatin (CRESTOR) 40 MG tablet Take 1 Tablet by mouth every day. 100 Tablet 3    lisinopril (PRINIVIL) 40 MG tablet Take 1 tablet by mouth once daily 100 Tablet 3    ezetimibe (ZETIA) 10 MG Tab Take 1 tablet by mouth once daily 100 Tablet 3    Naloxone (NARCAN) 4 MG/0.1ML Liquid One spray in one nostril for overdose and call 911. 1 Each 0    Coenzyme Q10 (CO Q 10 PO) Take 1 Capsule by mouth every day.      Celestina, Zingiber officinalis, (CELESTINA PO) Take  by mouth.      Ergocalciferol (VITAMIN D2 PO) Take  by mouth.      CRANBERRY EXTRACT PO Take  by mouth.      CINNAMON PO Take  by mouth.      Ferrous Sulfate (IRON PO) Take  by mouth.       No current facility-administered medications for this visit.       /78 (BP Location: Left arm, Patient Position: Sitting, BP Cuff Size: Adult)   Pulse 78   Temp 36.7 °C (98.1 °F) (Temporal)   Ht 1.626 m (5' 4\")   Wt 59.4 kg (131 lb)   SpO2 98% , Body mass index is 22.49 kg/m².      Physical Exam  Constitutional:       Appearance: Normal appearance. She is well-developed and well-groomed.   HENT:      Head: Normocephalic and atraumatic.      Right Ear: External ear normal.      Left Ear: External ear normal.   Eyes:      General:         Right " eye: No discharge.         Left eye: No discharge.      Conjunctiva/sclera: Conjunctivae normal.   Cardiovascular:      Rate and Rhythm: Normal rate.   Pulmonary:      Effort: Pulmonary effort is normal. No respiratory distress.   Musculoskeletal:      Cervical back: Neck supple.   Skin:     Findings: No rash.   Neurological:      Mental Status: She is alert.   Psychiatric:         Mood and Affect: Mood and affect normal.         Behavior: Behavior normal.            I reviewed with patient lab results resulted on 11/19/2024.        Please note that this dictation was created using voice recognition software. I have made every reasonable attempt to correct obvious errors, but I expect that there are errors of grammar and possibly content that I did not discover before finalizing the note.

## 2024-11-27 NOTE — LETTER
[facility] [email or web address])  Nikolay Mauro M.D.  [address]  [place] [address]  Fax: [phone]   Authorization for Release/Disclosure of   Protected Health Information   Name: AMINA SAEED : 1955 SSN: [id number]   Address: [facility] [place] [address] Phone:    There are no phone numbers on file.   I authorize the entity listed below to release/disclose the PHI below to:   [facility] [facility]/Nikolay Mauro M.D. and Nikolay Mauro M.D.   Provider or Entity Name:     Address   City, State, Zip   Phone:      Fax:     Reason for request: continuity of care   Information to be released:    [  ] LAST COLONOSCOPY,  including any PATH REPORT and follow-up  [  ] LAST FIT/COLOGUARD RESULT [  ] LAST DEXA  [  ] LAST MAMMOGRAM  [  ] LAST PAP  [  ] LAST LABS [  ] RETINA EXAM REPORT  [  ] IMMUNIZATION RECORDS  [  ] Release all info      [  ] Check here and initial the line next to each item to release ALL health information INCLUDING  _____ Care and treatment for drug and / or alcohol abuse  _____ HIV testing, infection status, or AIDS  _____ Genetic Testing    DATES OF SERVICE OR TIME PERIOD TO BE DISCLOSED: _____________  I understand and acknowledge that:  * This Authorization may be revoked at any time by you in writing, except if your health information has already been used or disclosed.  * Your health information that will be used or disclosed as a result of you signing this authorization could be re-disclosed by the recipient. If this occurs, your re-disclosed health information may no longer be protected by State or Federal laws.  * You may refuse to sign this Authorization. Your refusal will not affect your ability to obtain treatment.  * This Authorization becomes effective upon signing and will  on (date) __________.      If no date is indicated, this Authorization will  one (1) year from the signature date.    Name: Amina Saeed  Signature: Date:   2024     PLEASE  FAX REQUESTED RECORDS BACK TO: [phone]

## 2024-12-02 DIAGNOSIS — E78.2 MIXED HYPERLIPIDEMIA: ICD-10-CM

## 2024-12-02 RX ORDER — ROSUVASTATIN CALCIUM 40 MG/1
40 TABLET, COATED ORAL DAILY
Qty: 100 TABLET | Refills: 1 | Status: SHIPPED | OUTPATIENT
Start: 2024-12-02

## 2024-12-02 NOTE — TELEPHONE ENCOUNTER
Pt has had OV within the 12 month protocol and lipid panel is current. 6 month supply of statin sent to pharmacy.   Lab Results   Component Value Date/Time    CHOLSTRLTOT 198 11/19/2024 10:03 AM    LDL 88 11/19/2024 10:03 AM    HDL 77 11/19/2024 10:03 AM    TRIGLYCERIDE 165 (H) 11/19/2024 10:03 AM       Lab Results   Component Value Date/Time    SODIUM 136 11/19/2024 10:03 AM    POTASSIUM 4.0 11/19/2024 10:03 AM    CHLORIDE 101 11/19/2024 10:03 AM    CO2 22 11/19/2024 10:03 AM    GLUCOSE 101 (H) 11/19/2024 10:03 AM    BUN 27 (H) 11/19/2024 10:03 AM    CREATININE 0.84 11/19/2024 10:03 AM    CREATININE 0.7 11/06/2006 04:00 PM     Lab Results   Component Value Date/Time    ALKPHOSPHAT 106 (H) 11/19/2024 10:03 AM    ASTSGOT 22 11/19/2024 10:03 AM    ALTSGPT 37 11/19/2024 10:03 AM    TBILIRUBIN 0.4 11/19/2024 10:03 AM      Hannah Avitia, Clinical Pharmacist, CDE, CACP  Managed Care Pharmacist for [facility] and [facility]

## 2025-02-19 DIAGNOSIS — E78.2 MIXED HYPERLIPIDEMIA: ICD-10-CM

## 2025-02-19 RX ORDER — ATORVASTATIN CALCIUM 80 MG/1
80 TABLET, FILM COATED ORAL EVERY EVENING
Qty: 100 TABLET | Refills: 3 | OUTPATIENT
Start: 2025-02-19

## 2025-02-19 RX ORDER — ROSUVASTATIN CALCIUM 40 MG/1
40 TABLET, COATED ORAL DAILY
Qty: 100 TABLET | Refills: 3 | Status: SHIPPED | OUTPATIENT
Start: 2025-02-19

## 2025-02-20 NOTE — TELEPHONE ENCOUNTER
Received request via: Pharmacy    Was the patient seen in the last year in this department? Yes    Does the patient have an active prescription (recently filled or refills available) for medication(s) requested? No    Pharmacy Name: [facility]    Does the patient have shelter Plus and need 100-day supply? (This applies to ALL medications) Yes, quantity updated to 100 days

## 2025-03-27 ENCOUNTER — OFFICE VISIT (OUTPATIENT)
Dept: MEDICAL GROUP | Facility: MEDICAL CENTER | Age: 70
End: 2025-03-27
Payer: MEDICARE

## 2025-03-27 ENCOUNTER — HOSPITAL ENCOUNTER (OUTPATIENT)
Facility: MEDICAL CENTER | Age: 70
End: 2025-03-27
Attending: FAMILY MEDICINE
Payer: MEDICARE

## 2025-03-27 VITALS
HEIGHT: 64 IN | WEIGHT: 134.92 LBS | SYSTOLIC BLOOD PRESSURE: 106 MMHG | HEART RATE: 89 BPM | OXYGEN SATURATION: 97 % | TEMPERATURE: 97.3 F | DIASTOLIC BLOOD PRESSURE: 74 MMHG | BODY MASS INDEX: 23.03 KG/M2

## 2025-03-27 DIAGNOSIS — I10 PRIMARY HYPERTENSION: ICD-10-CM

## 2025-03-27 DIAGNOSIS — Z51.81 MEDICATION MONITORING ENCOUNTER: ICD-10-CM

## 2025-03-27 DIAGNOSIS — M25.511 CHRONIC RIGHT SHOULDER PAIN: ICD-10-CM

## 2025-03-27 DIAGNOSIS — E78.2 MIXED HYPERLIPIDEMIA: ICD-10-CM

## 2025-03-27 DIAGNOSIS — G89.29 CHRONIC RIGHT SHOULDER PAIN: ICD-10-CM

## 2025-03-27 PROCEDURE — 3078F DIAST BP <80 MM HG: CPT | Performed by: FAMILY MEDICINE

## 2025-03-27 PROCEDURE — 3074F SYST BP LT 130 MM HG: CPT | Performed by: FAMILY MEDICINE

## 2025-03-27 PROCEDURE — 80307 DRUG TEST PRSMV CHEM ANLYZR: CPT

## 2025-03-27 PROCEDURE — 99214 OFFICE O/P EST MOD 30 MIN: CPT | Performed by: FAMILY MEDICINE

## 2025-03-27 RX ORDER — EZETIMIBE 10 MG/1
10 TABLET ORAL DAILY
Qty: 100 TABLET | Refills: 3 | Status: SHIPPED | OUTPATIENT
Start: 2025-03-27

## 2025-03-27 RX ORDER — ROSUVASTATIN CALCIUM 40 MG/1
40 TABLET, COATED ORAL DAILY
Qty: 100 TABLET | Refills: 3 | Status: SHIPPED | OUTPATIENT
Start: 2025-03-27

## 2025-03-27 RX ORDER — HYDROCHLOROTHIAZIDE 25 MG/1
25 TABLET ORAL DAILY
Qty: 100 TABLET | Refills: 3 | Status: SHIPPED | OUTPATIENT
Start: 2025-03-27

## 2025-03-27 RX ORDER — LISINOPRIL 40 MG/1
40 TABLET ORAL DAILY
Qty: 100 TABLET | Refills: 3 | Status: SHIPPED | OUTPATIENT
Start: 2025-03-27

## 2025-03-27 RX ORDER — HYDROCODONE BITARTRATE AND ACETAMINOPHEN 5; 325 MG/1; MG/1
1 TABLET ORAL
Qty: 30 TABLET | Refills: 0 | Status: SHIPPED | OUTPATIENT
Start: 2025-03-27 | End: 2025-04-26

## 2025-03-27 ASSESSMENT — ENCOUNTER SYMPTOMS
FEVER: 0
CHILLS: 0
PALPITATIONS: 0

## 2025-03-27 ASSESSMENT — PATIENT HEALTH QUESTIONNAIRE - PHQ9: CLINICAL INTERPRETATION OF PHQ2 SCORE: 0

## 2025-03-27 NOTE — PROGRESS NOTES
Verbal consent was acquired by the patient to use [employer] ambient listening note generation during this visit.      Amina was seen today for follow-up.    Diagnoses and all orders for this visit:    Mixed hyperlipidemia  -     ezetimibe (ZETIA) 10 MG Tab; Take 1 Tablet by mouth every day.  -     rosuvastatin (CRESTOR) 40 MG tablet; Take 1 Tablet by mouth every day.    Primary hypertension  -     hydroCHLOROthiazide 25 MG Tab; Take 1 Tablet by mouth every day.  -     lisinopril (PRINIVIL) 40 MG tablet; Take 1 Tablet by mouth every day.    Medication monitoring encounter  -     URINE DRUG SCREEN W/CONF (AR); Future    Chronic right shoulder pain  -     HYDROcodone-acetaminophen (NORCO) 5-325 MG Tab per tablet; Take 1 Tablet by mouth 1 time a day as needed (moderate to severe pain) for up to 30 days.                  Assessment & Plan  1.  Chronic right shoulder pain  Chronic condition, stable, continue Norco 5-325 mg 1 tablet daily as needed for pain.  Checked PDMP.  Controlled substance treatment is signed in chart.  Check urine drug screen      Obtained and reviewed patient utilization report from [facility] pharmacy database on 3/27/2025 1:45 PM  prior to writing prescription for controlled substance II, III or IV per [place] bill . Based on assessment of the report,my physical exam if necessary and the patient's health problem, the prescription is medically necessary.        2.  Primary hypertension  Chronic condition, stable, continue lisinopril 40 mg daily, hydrochlorothiazide 25 mg daily.    3.  Mixed hyperlipidemia  Chronic condition, mildly elevated triglyceride level, continue Crestor 40 g daily and Zetia 10 mg daily.  Recheck labs ordered at previous visit.    Lab Results   Component Value Date/Time    CHOLSTRLTOT 198 11/19/2024 1003    TRIGLYCERIDE 165 (H) 11/19/2024 1003    HDL 77 11/19/2024 1003    LDL 88 11/19/2024 1003        Follow-up in August for medication follow-up.      Chief  complaint::Diagnoses of Mixed hyperlipidemia, Primary hypertension, Medication monitoring encounter, and Chronic right shoulder pain were pertinent to this visit.      History of Present Illness  The patient is a 69-year-old female who presents for a medication refill.    Medication Regimen  - She has been under a regimen of hydrocodone, which she reports as being effective.  - She has reduced her intake to twice weekly, with the remaining supply expected to last until the end of the year.  - She anticipates needing an additional prescription.  - She was previously prescribed oxycodone following shoulder surgery but found it too potent and prefers milder medications.    She requested refill for other medications also.      Review of Systems   Constitutional:  Negative for chills and fever.   Cardiovascular:  Negative for chest pain, palpitations and leg swelling.   Musculoskeletal:  Positive for joint pain.          Medications and Allergies:     Current Outpatient Medications   Medication Sig Dispense Refill    ezetimibe (ZETIA) 10 MG Tab Take 1 Tablet by mouth every day. 100 Tablet 3    hydroCHLOROthiazide 25 MG Tab Take 1 Tablet by mouth every day. 100 Tablet 3    lisinopril (PRINIVIL) 40 MG tablet Take 1 Tablet by mouth every day. 100 Tablet 3    rosuvastatin (CRESTOR) 40 MG tablet Take 1 Tablet by mouth every day. 100 Tablet 3    HYDROcodone-acetaminophen (NORCO) 5-325 MG Tab per tablet Take 1 Tablet by mouth 1 time a day as needed (moderate to severe pain) for up to 30 days. 30 Tablet 0    Naloxone (NARCAN) 4 MG/0.1ML Liquid One spray in one nostril for overdose and call 911. 1 Each 0    Coenzyme Q10 (CO Q 10 PO) Take 1 Capsule by mouth every day.      Ginger, Zingiber officinalis, (GINGER PO) Take  by mouth.      Ergocalciferol (VITAMIN D2 PO) Take  by mouth.      CRANBERRY EXTRACT PO Take  by mouth.      CINNAMON PO Take  by mouth.      Ferrous Sulfate (IRON PO) Take  by mouth.       No current  "facility-administered medications for this visit.       /74 (BP Location: Left arm, Patient Position: Sitting, BP Cuff Size: Adult)   Pulse 89   Temp 36.3 °C (97.3 °F) (Temporal)   Ht 1.626 m (5' 4\")   Wt 61.2 kg (134 lb 14.7 oz)   SpO2 97% , Body mass index is 23.16 kg/m².      Physical Exam  Constitutional:       Appearance: Normal appearance. She is well-developed and well-groomed.   HENT:      Head: Normocephalic and atraumatic.      Right Ear: External ear normal.      Left Ear: External ear normal.   Eyes:      General:         Right eye: No discharge.         Left eye: No discharge.      Conjunctiva/sclera: Conjunctivae normal.   Cardiovascular:      Rate and Rhythm: Normal rate.   Pulmonary:      Effort: Pulmonary effort is normal. No respiratory distress.   Musculoskeletal:      Cervical back: Neck supple.   Skin:     Findings: No rash.   Neurological:      Mental Status: She is alert.   Psychiatric:         Mood and Affect: Mood and affect normal.         Behavior: Behavior normal.              Please note that this dictation was created using voice recognition software. I have made every reasonable attempt to correct obvious errors, but I expect that there are errors of grammar and possibly content that I did not discover before finalizing the note.      "

## 2025-03-29 LAB
AMPHET CTO UR CFM-MCNC: NEGATIVE NG/ML
BARBITURATES CTO UR CFM-MCNC: NEGATIVE NG/ML
BENZODIAZ CTO UR CFM-MCNC: NEGATIVE NG/ML
CANNABINOIDS CTO UR CFM-MCNC: NEGATIVE NG/ML
COCAINE CTO UR CFM-MCNC: NEGATIVE NG/ML
CREAT UR-MCNC: 117.4 MG/DL (ref 20–400)
DRUG COMMENT 753798: NORMAL
METHADONE CTO UR CFM-MCNC: NEGATIVE NG/ML
OPIATES CTO UR CFM-MCNC: NEGATIVE NG/ML
PCP CTO UR CFM-MCNC: NEGATIVE NG/ML
PROPOXYPH CTO UR CFM-MCNC: NEGATIVE NG/ML

## 2025-03-31 ENCOUNTER — RESULTS FOLLOW-UP (OUTPATIENT)
Dept: MEDICAL GROUP | Facility: MEDICAL CENTER | Age: 70
End: 2025-03-31

## 2025-07-08 ENCOUNTER — HOSPITAL ENCOUNTER (OUTPATIENT)
Dept: RADIOLOGY | Facility: MEDICAL CENTER | Age: 70
End: 2025-07-08
Attending: FAMILY MEDICINE
Payer: MEDICARE

## 2025-07-08 DIAGNOSIS — Z12.31 VISIT FOR SCREENING MAMMOGRAM: ICD-10-CM

## 2025-07-08 PROCEDURE — 77063 BREAST TOMOSYNTHESIS BI: CPT

## 2025-08-01 ENCOUNTER — APPOINTMENT (OUTPATIENT)
Dept: LAB | Facility: MEDICAL CENTER | Age: 70
End: 2025-08-01
Payer: MEDICARE

## 2025-08-08 ENCOUNTER — HOSPITAL ENCOUNTER (OUTPATIENT)
Dept: LAB | Facility: MEDICAL CENTER | Age: 70
End: 2025-08-08
Attending: FAMILY MEDICINE
Payer: MEDICARE

## 2025-08-08 DIAGNOSIS — R73.01 ELEVATED FASTING GLUCOSE: ICD-10-CM

## 2025-08-08 DIAGNOSIS — M79.675 PAIN OF TOE OF LEFT FOOT: ICD-10-CM

## 2025-08-08 DIAGNOSIS — E78.2 MIXED HYPERLIPIDEMIA: ICD-10-CM

## 2025-08-08 LAB
ALBUMIN SERPL BCP-MCNC: 4.5 G/DL (ref 3.2–4.9)
ALBUMIN/GLOB SERPL: 1.4 G/DL
ALP SERPL-CCNC: 95 U/L (ref 30–99)
ALT SERPL-CCNC: 51 U/L (ref 2–50)
ANION GAP SERPL CALC-SCNC: 13 MMOL/L (ref 7–16)
AST SERPL-CCNC: 25 U/L (ref 12–45)
BILIRUB SERPL-MCNC: 0.6 MG/DL (ref 0.1–1.5)
BUN SERPL-MCNC: 16 MG/DL (ref 8–22)
CALCIUM ALBUM COR SERPL-MCNC: 9.4 MG/DL (ref 8.5–10.5)
CALCIUM SERPL-MCNC: 9.8 MG/DL (ref 8.5–10.5)
CHLORIDE SERPL-SCNC: 104 MMOL/L (ref 96–112)
CHOLEST SERPL-MCNC: 209 MG/DL (ref 100–199)
CO2 SERPL-SCNC: 22 MMOL/L (ref 20–33)
CREAT SERPL-MCNC: 0.69 MG/DL (ref 0.5–1.4)
EST. AVERAGE GLUCOSE BLD GHB EST-MCNC: 114 MG/DL
GFR SERPLBLD CREATININE-BSD FMLA CKD-EPI: 93 ML/MIN/1.73 M 2
GLOBULIN SER CALC-MCNC: 3.3 G/DL (ref 1.9–3.5)
GLUCOSE SERPL-MCNC: 94 MG/DL (ref 65–99)
HBA1C MFR BLD: 5.6 % (ref 4–5.6)
HDLC SERPL-MCNC: 72 MG/DL
LDLC SERPL CALC-MCNC: 104 MG/DL
POTASSIUM SERPL-SCNC: 4.2 MMOL/L (ref 3.6–5.5)
PROT SERPL-MCNC: 7.8 G/DL (ref 6–8.2)
SODIUM SERPL-SCNC: 139 MMOL/L (ref 135–145)
TRIGL SERPL-MCNC: 164 MG/DL (ref 0–149)
URATE SERPL-MCNC: 5.7 MG/DL (ref 1.9–8.2)

## 2025-08-08 PROCEDURE — 84550 ASSAY OF BLOOD/URIC ACID: CPT

## 2025-08-08 PROCEDURE — 36415 COLL VENOUS BLD VENIPUNCTURE: CPT

## 2025-08-08 PROCEDURE — 83036 HEMOGLOBIN GLYCOSYLATED A1C: CPT

## 2025-08-08 PROCEDURE — 80061 LIPID PANEL: CPT

## 2025-08-08 PROCEDURE — 80053 COMPREHEN METABOLIC PANEL: CPT

## 2025-08-21 ENCOUNTER — OFFICE VISIT (OUTPATIENT)
Dept: MEDICAL GROUP | Facility: MEDICAL CENTER | Age: 70
End: 2025-08-21
Payer: MEDICARE

## 2025-08-21 VITALS
DIASTOLIC BLOOD PRESSURE: 68 MMHG | SYSTOLIC BLOOD PRESSURE: 102 MMHG | HEIGHT: 65 IN | OXYGEN SATURATION: 96 % | TEMPERATURE: 99.2 F | HEART RATE: 87 BPM | BODY MASS INDEX: 21.49 KG/M2 | WEIGHT: 129 LBS

## 2025-08-21 DIAGNOSIS — Z12.83 SCREENING FOR SKIN CANCER: ICD-10-CM

## 2025-08-21 DIAGNOSIS — M25.511 CHRONIC RIGHT SHOULDER PAIN: ICD-10-CM

## 2025-08-21 DIAGNOSIS — G89.29 CHRONIC RIGHT SHOULDER PAIN: ICD-10-CM

## 2025-08-21 DIAGNOSIS — E78.2 MIXED HYPERLIPIDEMIA: Primary | ICD-10-CM

## 2025-08-21 PROCEDURE — 3078F DIAST BP <80 MM HG: CPT | Performed by: FAMILY MEDICINE

## 2025-08-21 PROCEDURE — 3074F SYST BP LT 130 MM HG: CPT | Performed by: FAMILY MEDICINE

## 2025-08-21 PROCEDURE — 99214 OFFICE O/P EST MOD 30 MIN: CPT | Performed by: FAMILY MEDICINE

## 2025-08-21 RX ORDER — EZETIMIBE 10 MG/1
10 TABLET ORAL DAILY
Qty: 100 TABLET | Refills: 3 | Status: SHIPPED | OUTPATIENT
Start: 2025-08-21

## 2025-08-21 RX ORDER — ROSUVASTATIN CALCIUM 40 MG/1
40 TABLET, COATED ORAL DAILY
Qty: 100 TABLET | Refills: 3 | Status: SHIPPED | OUTPATIENT
Start: 2025-08-21

## 2025-08-21 RX ORDER — HYDROCODONE BITARTRATE AND ACETAMINOPHEN 5; 325 MG/1; MG/1
1 TABLET ORAL
Qty: 30 TABLET | Refills: 0 | Status: SHIPPED | OUTPATIENT
Start: 2025-08-21 | End: 2025-09-20

## 2025-08-21 RX ORDER — HYDROCODONE BITARTRATE AND ACETAMINOPHEN 5; 325 MG/1; MG/1
1 TABLET ORAL
Qty: 30 TABLET | Refills: 0 | Status: SHIPPED | OUTPATIENT
Start: 2025-09-20 | End: 2025-10-20

## 2025-08-21 ASSESSMENT — ENCOUNTER SYMPTOMS
CHILLS: 0
PALPITATIONS: 0
FEVER: 0

## 2025-08-22 ENCOUNTER — TELEPHONE (OUTPATIENT)
Dept: MEDICAL GROUP | Facility: MEDICAL CENTER | Age: 70
End: 2025-08-22
Payer: MEDICARE

## 2025-08-22 DIAGNOSIS — E78.2 MIXED HYPERLIPIDEMIA: Primary | ICD-10-CM

## 2025-08-27 ENCOUNTER — TELEPHONE (OUTPATIENT)
Dept: MEDICAL GROUP | Facility: MEDICAL CENTER | Age: 70
End: 2025-08-27
Payer: MEDICARE

## (undated) DEVICE — SUCTION INSTRUMENT YANKAUER BULBOUS TIP W/O VENT (50EA/CA)

## (undated) DEVICE — SYRINGE 50ML CATHETER TIP (40EA/BX 4BX/CA)

## (undated) DEVICE — MASK OXYGEN VNYL ADLT MED CONC WITH 7 FOOT TUBING  - (50EA/CA)

## (undated) DEVICE — Device

## (undated) DEVICE — TUBING CLEARLINK DUO-VENT - C-FLO (48EA/CA)

## (undated) DEVICE — STOCKINETTE IMPERVIOUS 12X48 - STERILELF (10/CA)"

## (undated) DEVICE — TIP INTPLS HFLO ML ORFC BTRY - (12/CS)  FOR SURGILAV

## (undated) DEVICE — CANNULA W/ SUPPLY TUBING O2 - (50/CA)

## (undated) DEVICE — SPONGE GAUZESTER 4 X 4 4PLY - (128PK/CA)

## (undated) DEVICE — SET LEADWIRE 5 LEAD BEDSIDE DISPOSABLE ECG (1SET OF 5/EA)

## (undated) DEVICE — SUTURE 0 ETHIBOND CT-1 - (12/BX) 18 INCH

## (undated) DEVICE — PACK TOTAL HIP - (1/CA)

## (undated) DEVICE — BLADE SAW 90X25X1.37MM SAGITTAL DUAL CUT

## (undated) DEVICE — STAPLER SKIN DISP - (6/BX 10BX/CA) VISISTAT

## (undated) DEVICE — CANISTER SUCTION 3000ML MECHANICAL FILTER AUTO SHUTOFF MEDI-VAC NONSTERILE LF DISP  (40EA/CA)

## (undated) DEVICE — TOWELS CLOTH SURGICAL - (4/PK 20PK/CA)

## (undated) DEVICE — SENSOR OXIMETER ADULT SPO2 RD SET (20EA/BX)

## (undated) DEVICE — SUTURE 2-0 MONOCRYL PLUS UNDYED CT-1 1 X 36 (36EA/BX)"

## (undated) DEVICE — TUBE E-T HI-LO CUFF 7.0MM (10EA/PK)

## (undated) DEVICE — GLOVE BIOGEL PI INDICATOR SZ 6.0 SURGICAL PF LF -(200PR/CA)

## (undated) DEVICE — GLOVE BIOGEL INDICATOR SZ 8 SURGICAL PF LTX - (50/BX 4BX/CA)

## (undated) DEVICE — SUTURE 5 TI-CRON HOS-14 - (36/BX)

## (undated) DEVICE — GOWN WARMING STANDARD FLEX - (30/CA)

## (undated) DEVICE — FOAM FACEHOLDER SPIDER (8EA/BX)

## (undated) DEVICE — TUBE CONNECTING SUCTION - CLEAR PLASTIC STERILE 72 IN (50EA/CA)

## (undated) DEVICE — WATER IRRIGATION STERILE 1000ML (12EA/CA)

## (undated) DEVICE — SLEEVE VASO CALF MED - (10PR/CA)

## (undated) DEVICE — GOWN SURGICAL XX-LARGE - (28EA/CA) SIRUS NON REINFORCED

## (undated) DEVICE — KIT  I.V. START (100EA/CA)

## (undated) DEVICE — HANDPIECE 10FT INTPLS SCT PLS IRRIGATION HAND CONTROL SET (6/PK)

## (undated) DEVICE — GLOVE BIOGEL PI INDICATOR SZ 8.5 SURGICAL PF LF - (50PR/BX 4BX/CA)

## (undated) DEVICE — LACTATED RINGERS INJ 1000 ML - (14EA/CA 60CA/PF)

## (undated) DEVICE — DRAPE U SPLIT IMP 54 X 76 - (24/CA)

## (undated) DEVICE — SYS BN CMNT HI VAC KT MXR BWL - (MIX-E-VAC II)  (10EA/CA)

## (undated) DEVICE — DRAPE U ORTHOPEDIC - (10/BX)

## (undated) DEVICE — DRESSING XEROFORM 1X8 - (50/BX 4BX/CA)

## (undated) DEVICE — SUTURE GENERAL

## (undated) DEVICE — GLOVESZ 8.5 BIOGEL PI MICRO - PF LF (50PR/BX)

## (undated) DEVICE — DRAPE SURGICAL U 77X120 - (10/CA)

## (undated) DEVICE — GLOVE BIOGEL SZ 7.5 SURGICAL PF LTX - (50PR/BX 4BX/CA)

## (undated) DEVICE — SLING ORTH UNV TIETX VLFM ARM

## (undated) DEVICE — DRAPE STRLE REG TOWEL 18X24 - (10/BX 4BX/CA)"

## (undated) DEVICE — SODIUM CHL IRRIGATION 0.9% 1000ML (12EA/CA)

## (undated) DEVICE — TOWEL STOP TIMEOUT SAFETY FLAG (40EA/CA)

## (undated) DEVICE — CANISTER SUCTION RIGID RED 1500CC (40EA/CA)